# Patient Record
Sex: MALE | Race: WHITE | NOT HISPANIC OR LATINO | Employment: OTHER | URBAN - METROPOLITAN AREA
[De-identification: names, ages, dates, MRNs, and addresses within clinical notes are randomized per-mention and may not be internally consistent; named-entity substitution may affect disease eponyms.]

---

## 2017-02-17 ENCOUNTER — OFFICE VISIT (OUTPATIENT)
Dept: FAMILY MEDICINE | Facility: CLINIC | Age: 82
End: 2017-02-17
Payer: MEDICARE

## 2017-02-17 VITALS
OXYGEN SATURATION: 96 % | HEART RATE: 78 BPM | HEIGHT: 73 IN | WEIGHT: 208.56 LBS | SYSTOLIC BLOOD PRESSURE: 136 MMHG | DIASTOLIC BLOOD PRESSURE: 80 MMHG | TEMPERATURE: 98 F | BODY MASS INDEX: 27.64 KG/M2

## 2017-02-17 DIAGNOSIS — B34.9 VIRAL INFECTION: Primary | ICD-10-CM

## 2017-02-17 PROCEDURE — 96372 THER/PROPH/DIAG INJ SC/IM: CPT | Mod: S$GLB,,, | Performed by: NURSE PRACTITIONER

## 2017-02-17 PROCEDURE — 99213 OFFICE O/P EST LOW 20 MIN: CPT | Mod: 25,S$GLB,, | Performed by: NURSE PRACTITIONER

## 2017-02-17 RX ORDER — TRIAMCINOLONE ACETONIDE 40 MG/ML
80 INJECTION, SUSPENSION INTRA-ARTICULAR; INTRAMUSCULAR
Status: COMPLETED | OUTPATIENT
Start: 2017-02-17 | End: 2017-02-17

## 2017-02-17 RX ORDER — PANTOPRAZOLE SODIUM 40 MG/1
TABLET, DELAYED RELEASE ORAL
Refills: 12 | COMMUNITY
Start: 2017-01-25

## 2017-02-17 RX ORDER — METOPROLOL SUCCINATE 50 MG/1
50 TABLET, EXTENDED RELEASE ORAL DAILY
Refills: 1 | COMMUNITY
Start: 2017-01-16 | End: 2018-12-03

## 2017-02-17 RX ADMIN — TRIAMCINOLONE ACETONIDE 80 MG: 40 INJECTION, SUSPENSION INTRA-ARTICULAR; INTRAMUSCULAR at 10:02

## 2017-02-17 NOTE — PROGRESS NOTES
Subjective:       Patient ID: Karthikeyan Marshall is a 83 y.o. male.    Chief Complaint: Generalized Body Aches; Nasal Congestion; Sore Throat; and Cough    Sore Throat    This is a new problem. The current episode started in the past 7 days. Neither side of throat is experiencing more pain than the other. There has been no fever. The pain is at a severity of 2/10. The pain is mild. Associated symptoms include congestion, coughing, headaches and a hoarse voice. Pertinent negatives include no abdominal pain, diarrhea, drooling, ear discharge, ear pain, plugged ear sensation, neck pain, shortness of breath, stridor, swollen glands, trouble swallowing or vomiting. He has had no exposure to strep or mono. Treatments tried: zycam, cold ease. The treatment provided no relief.   Cough   This is a new problem. The current episode started in the past 7 days. The problem has been unchanged. The problem occurs every few minutes. The cough is productive of sputum. Associated symptoms include headaches, nasal congestion, postnasal drip and a sore throat. Pertinent negatives include no chest pain, chills, ear congestion, ear pain, fever, hemoptysis, myalgias, rash, rhinorrhea, shortness of breath, sweats, weight loss or wheezing. The symptoms are aggravated by lying down. Treatments tried: mucinex. There is no history of asthma, bronchiectasis, bronchitis, COPD, emphysema, environmental allergies or pneumonia.     Review of Systems   Constitutional: Negative for chills, diaphoresis, fatigue, fever and weight loss.   HENT: Positive for congestion, hoarse voice, postnasal drip and sore throat. Negative for drooling, ear discharge, ear pain, rhinorrhea and trouble swallowing.    Respiratory: Positive for cough. Negative for hemoptysis, chest tightness, shortness of breath, wheezing and stridor.    Cardiovascular: Negative for chest pain, palpitations and leg swelling.   Gastrointestinal: Negative for abdominal pain, diarrhea and vomiting.    Musculoskeletal: Negative for myalgias and neck pain.   Skin: Negative for rash.   Allergic/Immunologic: Negative for environmental allergies.   Neurological: Positive for headaches.       Objective:      Physical Exam   Constitutional: He is oriented to person, place, and time. He appears well-developed and well-nourished. No distress.   HENT:   Right Ear: Tympanic membrane and external ear normal.   Left Ear: Tympanic membrane and external ear normal.   Nose: No mucosal edema or rhinorrhea. Right sinus exhibits no maxillary sinus tenderness and no frontal sinus tenderness. Left sinus exhibits no maxillary sinus tenderness and no frontal sinus tenderness.   Mouth/Throat: No oropharyngeal exudate, posterior oropharyngeal edema or posterior oropharyngeal erythema.   Cardiovascular: Normal rate, regular rhythm and normal heart sounds.    Pulmonary/Chest: Effort normal and breath sounds normal.   Neurological: He is alert and oriented to person, place, and time.   Skin: Skin is warm and dry. He is not diaphoretic.   Psychiatric: He has a normal mood and affect. His behavior is normal. Judgment and thought content normal.   Vitals reviewed.      Assessment:       1. Viral infection        Plan:       Viral infection    Other orders  -     triamcinolone acetonide injection 80 mg; Inject 2 mLs (80 mg total) into the muscle one time.    continue mucinex for cough  Continue tylenol for body aches

## 2017-03-20 ENCOUNTER — OFFICE VISIT (OUTPATIENT)
Dept: FAMILY MEDICINE | Facility: CLINIC | Age: 82
End: 2017-03-20
Payer: MEDICARE

## 2017-03-20 VITALS
BODY MASS INDEX: 26.94 KG/M2 | WEIGHT: 203.25 LBS | TEMPERATURE: 98 F | SYSTOLIC BLOOD PRESSURE: 136 MMHG | HEART RATE: 97 BPM | OXYGEN SATURATION: 98 % | DIASTOLIC BLOOD PRESSURE: 70 MMHG | HEIGHT: 73 IN

## 2017-03-20 DIAGNOSIS — S41.111A SKIN TEAR OF UPPER ARM WITHOUT COMPLICATION, RIGHT, INITIAL ENCOUNTER: ICD-10-CM

## 2017-03-20 DIAGNOSIS — Z48.02 REMOVAL OF STAPLES: ICD-10-CM
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
## 2017-03-20 DIAGNOSIS — Z23 NEED FOR PNEUMOCOCCAL VACCINATION: ICD-10-CM

## 2017-03-20 DIAGNOSIS — R55 SYNCOPE, UNSPECIFIED SYNCOPE TYPE: Primary | ICD-10-CM

## 2017-03-20 PROCEDURE — G0009 ADMIN PNEUMOCOCCAL VACCINE: HCPCS | Mod: S$GLB,,, | Performed by: FAMILY MEDICINE

## 2017-03-20 PROCEDURE — 99213 OFFICE O/P EST LOW 20 MIN: CPT | Mod: S$GLB,,, | Performed by: FAMILY MEDICINE

## 2017-03-20 PROCEDURE — 90670 PCV13 VACCINE IM: CPT | Mod: S$GLB,,, | Performed by: FAMILY MEDICINE

## 2017-03-20 RX ORDER — ATORVASTATIN CALCIUM 10 MG/1
10 TABLET, FILM COATED ORAL DAILY
Refills: 1 | COMMUNITY
Start: 2017-03-08 | End: 2018-12-03

## 2017-03-20 NOTE — MR AVS SNAPSHOT
[facility]  [address] [place] [address]  [place] [address]  Phone: [phone]  Fax: [phone]                  Karthikeyan Marshall   3/20/2017 11:00 AM   Office Visit    Description:  Male : 1933   Provider:  Cy Blunt MD   Department:  [facility]           Reason for Visit     Follow-up           Diagnoses this Visit        Comments    Need for pneumococcal vaccination    -  Primary            To Do List           Goals (5 Years of Data)     None      PURCHASE these Medications (No prescription required)        Start End    wheelchair Milana 3/20/2017     Sig: For transportation    Class: OTC      [facility]     [facility] -  Assistance  Registered nurses in the [facility] provide clinical advisement, health education, appointment booking, and other advisory services.  Call for this free service at [phone].             Medications           Message regarding Medications     Verify the changes and/or additions to your medication regime listed below are the same as discussed with your clinician today.  If any of these changes or additions are incorrect, please notify your healthcare provider.        START taking these NEW medications        Refills    wheelchair Milana 0    Sig: For transportation    Class: OTC           Verify that the below list of medications is an accurate representation of the medications you are currently taking.  If none reported, the list may be blank. If incorrect, please contact your healthcare provider. Carry this list with you in case of emergency.           Current Medications     ascorbic acid (VITAMIN C) 500 MG tablet Take 500 mg by mouth once daily.    atorvastatin (LIPITOR) 10 MG tablet Take 10 mg by mouth once daily.    CALCIUM CARBONATE/VITAMIN D3 (VITAMIN D-3 ORAL) Take 5,000 Units by mouth once daily.     clopidogrel (PLAVIX) 75 mg tablet Take 75 mg by mouth once daily.    FOLIC  "ACID/MULTIVIT-MIN/LUTEIN (CENTRUM SILVER ORAL) Take by mouth once daily.     isosorbide mononitrate (IMDUR) 60 MG 24 hr tablet Take 60 mg by mouth once daily.    levetiracetam (KEPPRA) 750 MG Tab Take 750 mg by mouth 2 (two) times daily.    losartan (COZAAR) 100 MG tablet Take 100 mg by mouth once daily.    metoprolol succinate (TOPROL-XL) 50 MG 24 hr tablet Take 50 mg by mouth once daily.    pantoprazole (PROTONIX) 40 MG tablet TAKE 1 BY MOUTH ONCE A DAY    phenytoin (DILANTIN) 100 MG ER capsule 4 capsules and 5 capsules alternating days    TUDORZA PRESSAIR 400 mcg/actuation AePB Inhale 1 puff into the lungs 2 (two) times daily.    VENTOLIN HFA 90 mcg/actuation inhaler Inhale 1 puff into the lungs continuous prn.    wheelchair Milana For transportation           Clinical Reference Information           Your Vitals Were     BP Pulse Temp Height Weight SpO2    136/70 97 98.4 °F (36.9 °C) (Oral) 6' 0.5" (1.842 m) 92.2 kg (203 lb 4.2 oz) 98%    BMI                27.19 kg/m2          Blood Pressure          Most Recent Value    BP  136/70      Allergies as of 3/20/2017     Adhesive    Bactrim [Sulfamethoxazole-trimethoprim]      Immunizations Administered on Date of Encounter - 3/20/2017     Name Date Dose VIS Date Route    Pneumococcal Conjugate - 13 Valent  Incomplete 0.5 mL 11/5/2015 Intramuscular      Orders Placed During Today's Visit      Normal Orders This Visit    Pneumococcal Conjugate Vaccine (13 Valent) (IM)       Language Assistance Services     ATTENTION: Language assistance services are available, free of charge. Please call [phone].      ATENCIÓN: Si varshala margoth, tiene a bhatia disposición servicios gratuitos de asistencia lingüística. Llame al [phone].     PAL Ý: N?u b?n nói Ti?ng Vi?t, có các d?ch v? h? tr? ngôn ng? mi?n phí dành cho b?n. G?i s? [phone].         [facility] complies with applicable Federal civil rights laws and does not discriminate on the basis of race, " color, national origin, age, disability, or sex.

## 2017-03-26 NOTE — PROGRESS NOTES
Patient ID: Karthikeyan Marshall is a 83 y.o. male.    Chief Complaint: Follow-up (E/R follow-up, post fall, staple removal)    HPI      Karthikeyan Marshall is a 83 y.o. male here following up on admit for syncope.  Pt worked up extensivly and no cause other than suspected orthostatic hypotension found.  He had anther fall will trying to put on pants incorrectly and sustained a rt arm skin tear.   Pt with staples in scalp here for removal of them              Review of Symptoms    Constitutional  Neg activity change, No chills /fever   Resp  Neg hemoptysis, stridor, choking  CVS  Neg chest pain, palpitations    Physical Exam    Constitutional:  Oriented to person, place, and time.appears well-developed and well-nourished.  No distress.      HENT  Head: Normocephalic and atraumatic  Right Ear: External ear normal.   Left Ear: External ear normal.   Nose: External nose normal.   Mouth:  Moist mucus membranes.    Eyes:  Conjunctivae are normal. Right eye exhibits no discharge.  Left eye exhibits no discharge. No scleral icterus.  No periorbital edema    Cardiovascular:  Regular rate, Regular rhythm     No extrasystole  Normal S1-S2      Pulmonary/Chest:   Normal effort and breath sounds.  No wheezing, rhonchi or rales.    Musculoskeletal:  No edema. No obvious deformity No waisting       Neurological:  Alert and oriented to person, place, and time.   Coordination normal.     Skin:   Skin is warm and dry.  No diaphoresis.   No rash noted.   Scalp removed 3 staples without problem  Rt arm with skin tear   dresssed    Psychiatric: Normal mood and affect. Behavior is normal.  Judgment and thought content normal.       Assessment / Plan:      ICD-10-CM ICD-9-CM   1. Syncope, unspecified syncope type R55 780.2   2. Fall, sequela W19.XXXS 909.4     E929.3   3. Removal of staples Z48.02 V58.32   4. Need for pneumococcal vaccination Z23 V03.82   5. Skin tear of upper arm without complication, right, initial encounter S41.111A 880.03      Syncope, unspecified syncope type    Fall, sequela    Removal of staples    Need for pneumococcal vaccination  -     Pneumococcal Conjugate Vaccine (13 Valent) (IM)    Skin tear of upper arm without complication, right, initial encounter    Other orders  -     wheelchair Milana; For transportation; Refill: 0

## 2017-03-30 ENCOUNTER — PATIENT MESSAGE (OUTPATIENT)
Dept: FAMILY MEDICINE | Facility: CLINIC | Age: 82
End: 2017-03-30

## 2017-04-27 ENCOUNTER — OFFICE VISIT (OUTPATIENT)
Dept: FAMILY MEDICINE | Facility: CLINIC | Age: 82
End: 2017-04-27
Payer: MEDICARE

## 2017-04-27 VITALS
HEART RATE: 87 BPM | SYSTOLIC BLOOD PRESSURE: 136 MMHG | HEIGHT: 73 IN | WEIGHT: 202.38 LBS | OXYGEN SATURATION: 98 % | TEMPERATURE: 98 F | DIASTOLIC BLOOD PRESSURE: 78 MMHG | BODY MASS INDEX: 26.82 KG/M2

## 2017-04-27 DIAGNOSIS — R26.9 GAIT DISTURBANCE: ICD-10-CM

## 2017-04-27 DIAGNOSIS — S50.812S: ICD-10-CM

## 2017-04-27 DIAGNOSIS — M25.812 SHOULDER IMPINGEMENT, LEFT: Primary | ICD-10-CM

## 2017-04-27 PROCEDURE — 99213 OFFICE O/P EST LOW 20 MIN: CPT | Mod: S$GLB,,, | Performed by: FAMILY MEDICINE

## 2017-04-27 RX ORDER — MINOCYCLINE HYDROCHLORIDE 100 MG/1
100 CAPSULE ORAL EVERY 12 HOURS
Refills: 0 | COMMUNITY
Start: 2017-04-17 | End: 2018-12-03

## 2017-04-29 NOTE — PROGRESS NOTES
Patient ID: Karthikeyan Marshall is a 83 y.o. male.    Chief Complaint: Follow-up (hospital f/u)    HPI       Karthikeyan Marshall is a 83 y.o. male here following up on fall with recent's emergency room at [facility].  He has a few skin tears on the ulnar side of left forearm.  He has had several falls in the last few weeks.  No complaints of EGD complaints of dizziness LOC-no chest pain palpitations shortness of breath.  Appear to be gait related.    Complains of left shoulder pain occasionally and decreased range of motion with inability to abduct fully.        Review of Symptoms    Constitutional  multiple falls , No chills fever   Resp  Neg hemoptysis, stridor, choking  CVS  Neg chest pain, palpitations    Physical Exam    Constitutional:   Oriented to person, place, and time.appears well-developed and well-nourished.   No distress.     HENT  Head: Normocephalic and atraumatic  Right Ear: External ear normal.   Left Ear: External ear normal.   Nose: External nose normal.   Mouth: Moist mucous membranes    Eyes:   Conjunctivae are normal. Right eye exhibits no discharge. Left eye exhibits no discharge. No scleral icterus. No periorbital edema    Musculoskeletal:  No edema. No obvious deformity No waisting  Left shoulder with some crepitus abduction only up to 60°     slight decrease in muscle strength watching in standard supine position   Uses walker to ambulate.        Neurological:  Alert and oriented to person, place, and time. Coordination normal.     Skin:   Skin is warm and dry.  No diaphoresis.   No rash noted.   Forearm with 2 areas  abrasion-proximal one approximately 3 cm x 5 cm distal wound approximately 3 cm representative  Great granulation tissue below  Psychiatric: Normal mood and affect. Behavior is normal. Judgment and thought content normal.       Assessment / Plan:      ICD-10-CM ICD-9-CM   1. Shoulder impingement, left M75.42 726.2   2. Gait disturbance R26.9 781.2   3. Forearm abrasion, left, sequela  S50.812S 906.2     Shoulder impingement, left  -     Ambulatory Referral to Physical/Occupational Therapy    Gait disturbance  -     Ambulatory Referral to Physical/Occupational Therapy    Forearm abrasion, left, sequela      Abrasions left forearm-continue to address daily

## 2017-05-05 ENCOUNTER — PATIENT MESSAGE (OUTPATIENT)
Dept: FAMILY MEDICINE | Facility: CLINIC | Age: 82
End: 2017-05-05

## 2017-05-09 ENCOUNTER — PATIENT MESSAGE (OUTPATIENT)
Dept: FAMILY MEDICINE | Facility: CLINIC | Age: 82
End: 2017-05-09

## 2017-05-09 DIAGNOSIS — R26.9 GAIT DISTURBANCE: ICD-10-CM

## 2017-05-09 DIAGNOSIS — M25.812 SHOULDER IMPINGEMENT, LEFT: Primary | ICD-10-CM

## 2017-05-19 ENCOUNTER — TELEPHONE (OUTPATIENT)
Dept: FAMILY MEDICINE | Facility: CLINIC | Age: 82
End: 2017-05-19

## 2017-05-23 ENCOUNTER — TELEPHONE (OUTPATIENT)
Dept: FAMILY MEDICINE | Facility: CLINIC | Age: 82
End: 2017-05-23

## 2017-05-23 DIAGNOSIS — M25.812 SHOULDER IMPINGEMENT, LEFT: Primary | ICD-10-CM

## 2017-05-23 NOTE — TELEPHONE ENCOUNTER
----- Message from Ilda Alcala sent at 5/23/2017 11:47 AM CDT -----  Contact: Josie pt [phone]  Patient originally requested physical therapy with [facility] but there elevators are broken and wont be fixed until the beginning of June. Wife requesting a new referral for  [facility].

## 2017-10-20 ENCOUNTER — TELEPHONE (OUTPATIENT)
Dept: FAMILY MEDICINE | Facility: CLINIC | Age: 82
End: 2017-10-20

## 2017-10-24 ENCOUNTER — PATIENT MESSAGE (OUTPATIENT)
Dept: FAMILY MEDICINE | Facility: CLINIC | Age: 82
End: 2017-10-24

## 2017-10-24 DIAGNOSIS — R26.9 GAIT DISTURBANCE: ICD-10-CM

## 2017-10-24 DIAGNOSIS — G62.9 PERIPHERAL POLYNEUROPATHY: Primary | ICD-10-CM
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
## 2017-11-09 ENCOUNTER — TELEPHONE (OUTPATIENT)
Dept: FAMILY MEDICINE | Facility: CLINIC | Age: 82
End: 2017-11-09

## 2017-11-09 DIAGNOSIS — R26.9 GAIT DISTURBANCE: ICD-10-CM

## 2017-11-09 DIAGNOSIS — M25.812 SHOULDER IMPINGEMENT, LEFT: ICD-10-CM

## 2017-11-09 DIAGNOSIS — J43.9 PULMONARY EMPHYSEMA, UNSPECIFIED EMPHYSEMA TYPE: ICD-10-CM

## 2017-11-09 DIAGNOSIS — E11.40 TYPE 2 DIABETES MELLITUS WITH DIABETIC NEUROPATHY, WITHOUT LONG-TERM CURRENT USE OF INSULIN: ICD-10-CM

## 2017-11-09 DIAGNOSIS — K21.9 GASTROESOPHAGEAL REFLUX DISEASE WITHOUT ESOPHAGITIS: ICD-10-CM

## 2017-11-09 DIAGNOSIS — G62.9 PERIPHERAL POLYNEUROPATHY: Primary | ICD-10-CM

## 2017-11-09 NOTE — TELEPHONE ENCOUNTER
Please order labs to be done at [facility]   He has an appt. Here scheduled for nov 20

## 2017-11-14 LAB
ALBUMIN SERPL-MCNC: 4.7 G/DL (ref 3.6–5.1)
ALBUMIN/GLOB SERPL: 2.1 (CALC) (ref 1–2.5)
ALP SERPL-CCNC: 121 U/L (ref 40–115)
ALT SERPL-CCNC: 16 U/L (ref 9–46)
AST SERPL-CCNC: 28 U/L (ref 10–35)
BILIRUB SERPL-MCNC: 0.7 MG/DL (ref 0.2–1.2)
BUN SERPL-MCNC: 22 MG/DL (ref 7–25)
BUN/CREAT SERPL: ABNORMAL (CALC) (ref 6–22)
CALCIUM SERPL-MCNC: 9.8 MG/DL (ref 8.6–10.3)
CHLORIDE SERPL-SCNC: 102 MMOL/L (ref 98–110)
CO2 SERPL-SCNC: 28 MMOL/L (ref 20–31)
CREAT SERPL-MCNC: 1.06 MG/DL (ref 0.7–1.11)
GFR SERPL CREATININE-BSD FRML MDRD: 64 ML/MIN/1.73M2
GLOBULIN SER CALC-MCNC: 2.2 G/DL (CALC) (ref 1.9–3.7)
GLUCOSE SERPL-MCNC: 120 MG/DL (ref 65–99)
POTASSIUM SERPL-SCNC: 5.3 MMOL/L (ref 3.5–5.3)
PROT SERPL-MCNC: 6.9 G/DL (ref 6.1–8.1)
SODIUM SERPL-SCNC: 139 MMOL/L (ref 135–146)

## 2017-11-16 ENCOUNTER — TELEPHONE (OUTPATIENT)
Dept: FAMILY MEDICINE | Facility: CLINIC | Age: 82
End: 2017-11-16

## 2017-11-16 NOTE — TELEPHONE ENCOUNTER
----- Message from Cy Blunt MD sent at 11/14/2017 11:52 PM CST -----  Lab Results are good  Some mild abnormalities but no need to change current treatment plan  I notified the pt wife

## 2017-11-17 ENCOUNTER — TELEPHONE (OUTPATIENT)
Dept: FAMILY MEDICINE | Facility: CLINIC | Age: 82
End: 2017-11-17

## 2017-11-17 DIAGNOSIS — E11.9 TYPE 2 DIABETES MELLITUS WITHOUT COMPLICATION: ICD-10-CM

## 2017-11-17 NOTE — TELEPHONE ENCOUNTER
----- Message from Skye Larson sent at 11/17/2017  1:09 PM CST -----  Wife calling for orders to go to [facility] to have A1C & cholesterol drawn.     ** orders already placed.   Change orders and notify pt

## 2018-02-14 ENCOUNTER — PATIENT MESSAGE (OUTPATIENT)
Dept: FAMILY MEDICINE | Facility: CLINIC | Age: 83
End: 2018-02-14

## 2018-05-16 ENCOUNTER — PATIENT MESSAGE (OUTPATIENT)
Dept: ADMINISTRATIVE | Facility: HOSPITAL | Age: 83
End: 2018-05-16

## 2018-11-11 ENCOUNTER — PATIENT MESSAGE (OUTPATIENT)
Dept: FAMILY MEDICINE | Facility: CLINIC | Age: 83
End: 2018-11-11

## 2018-12-03 ENCOUNTER — PES CALL (OUTPATIENT)
Dept: ADMINISTRATIVE | Facility: CLINIC | Age: 83
End: 2018-12-03

## 2018-12-03 ENCOUNTER — OFFICE VISIT (OUTPATIENT)
Dept: FAMILY MEDICINE | Facility: CLINIC | Age: 83
End: 2018-12-03
Payer: MEDICARE

## 2018-12-03 VITALS
HEIGHT: 73 IN | BODY MASS INDEX: 24.37 KG/M2 | WEIGHT: 183.88 LBS | HEART RATE: 92 BPM | OXYGEN SATURATION: 97 % | TEMPERATURE: 98 F | SYSTOLIC BLOOD PRESSURE: 136 MMHG | DIASTOLIC BLOOD PRESSURE: 76 MMHG
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
## 2018-12-03 DIAGNOSIS — E11.40 TYPE 2 DIABETES MELLITUS WITH DIABETIC NEUROPATHY, WITHOUT LONG-TERM CURRENT USE OF INSULIN: ICD-10-CM

## 2018-12-03 DIAGNOSIS — R26.9 GAIT DISTURBANCE: ICD-10-CM

## 2018-12-03 DIAGNOSIS — Z00.00 ROUTINE HEALTH MAINTENANCE: Primary | ICD-10-CM

## 2018-12-03 DIAGNOSIS — G62.9 PERIPHERAL POLYNEUROPATHY: ICD-10-CM

## 2018-12-03 PROCEDURE — 90662 IIV NO PRSV INCREASED AG IM: CPT | Mod: S$GLB,,, | Performed by: FAMILY MEDICINE

## 2018-12-03 PROCEDURE — G0008 ADMIN INFLUENZA VIRUS VAC: HCPCS | Mod: S$GLB,,, | Performed by: FAMILY MEDICINE

## 2018-12-03 PROCEDURE — 99397 PER PM REEVAL EST PAT 65+ YR: CPT | Mod: 25,S$GLB,, | Performed by: FAMILY MEDICINE

## 2018-12-03 PROCEDURE — 3075F SYST BP GE 130 - 139MM HG: CPT | Mod: S$GLB,,, | Performed by: FAMILY MEDICINE

## 2018-12-03 PROCEDURE — 3078F DIAST BP <80 MM HG: CPT | Mod: S$GLB,,, | Performed by: FAMILY MEDICINE

## 2018-12-03 RX ORDER — MEMANTINE HYDROCHLORIDE 5 MG/1
1 TABLET ORAL DAILY
COMMUNITY
Start: 2018-11-11

## 2018-12-03 RX ORDER — IPRATROPIUM BROMIDE AND ALBUTEROL SULFATE 2.5; .5 MG/3ML; MG/3ML
SOLUTION RESPIRATORY (INHALATION)
Refills: 11 | COMMUNITY
Start: 2018-09-12

## 2018-12-03 RX ORDER — LANOLIN ALCOHOL/MO/W.PET/CERES
1 CREAM (GRAM) TOPICAL DAILY
COMMUNITY
Start: 2018-10-08 | End: 2020-02-28

## 2018-12-03 RX ORDER — INSULIN PUMP SYRINGE, 3 ML
EACH MISCELLANEOUS
Qty: 1 EACH | Refills: 0 | Status: SHIPPED | OUTPATIENT
Start: 2018-12-03

## 2018-12-03 NOTE — PROGRESS NOTES
Patient ID: Karthikeyan Marshall is a 85 y.o. male.    Chief Complaint: Annual Exam    HPI      Karthikeyan Marshall is a 85 y.o. male here for annual examination.  Overall doing well recently.  Continues to suffer with fatigue. Many medicines discontinued by cardiologist because of fatigue.  Patient with history of TIA, hypertension, emphysema, reflux, hyperlipidemia, seizures, peripheral neuropathy and diabetes.  Stable at this time.  Reviewed current medications.      Review of Symptoms    Constitutional  Neg activity change, No chills /fever   Resp  Neg hemoptysis, stridor, choking  CVS  Neg chest pain, palpitations   other review of systems no new problems      Physical Exam    Constitutional:  Oriented to person, place, and time.appears   Walks with walker   No distress.      HENT  Head: Normocephalic and atraumatic  Right Ear: External ear normal.   Left Ear: External ear normal.   Nose: External nose normal.   Mouth:  Moist mucus membranes.    Eyes:  Conjunctivae are normal. Right eye exhibits no discharge.  Left eye exhibits no discharge. No scleral icterus.  No periorbital edema    Cardiovascular:  Regular rate and rhythm with normal S1 and S2     Pulmonary/Chest:   Clear to auscultation bilaterally without wheezes, rhonchi or rales      Musculoskeletal:  No edema. No obvious deformity No wasting       Neurological:  Alert and oriented to person, place, and time.   Coordination normal.     Skin:   Skin is warm and dry.  No diaphoresis.   Areas of bruise and trauma No rash noted.     Psychiatric: Normal mood and affect. Behavior is normal.  Judgment and thought content normal.     Complete Blood Count  Lab Results   Component Value Date    RBC 4.75 05/06/2016    HGB 14.9 05/06/2016    HCT 44.0 05/06/2016    MCV 93 05/06/2016    MCH 31.4 (H) 05/06/2016    MCHC 33.9 05/06/2016    RDW 13.3 05/06/2016     05/06/2016    MPV 9.5 05/06/2016    GRAN 4.9 05/06/2016    GRAN 56.1 05/06/2016    LYMPH 2.6 05/06/2016    LYMPH  29.9 05/06/2016    MONO 0.9 05/06/2016    MONO 10.6 05/06/2016    EOS 0.2 05/06/2016    BASO 0.06 05/06/2016    EOSINOPHIL 2.5 05/06/2016    BASOPHIL 0.7 05/06/2016    DIFFMETHOD Automated 05/06/2016       Comprehensive Metabolic Panel  No results found for: GLU, BUN, CREATININE, NA, K, CL, PROT, ALBUMIN, BILITOT, AST, ALKPHOS, CO2, ALT, ANIONGAP, EGFRNONAA, ESTGFRAFRICA    TSH  No results found for: TSH    Assessment / Plan:      ICD-10-CM ICD-9-CM   1. Routine health maintenance Z00.00 V70.0   2. Peripheral polyneuropathy G62.9 356.9   3. Fall, sequela W19.XXXS 909.4     E929.3   4. Gait disturbance R26.9 781.2   5. Type 2 diabetes mellitus with diabetic neuropathy, without long-term current use of insulin E11.40 250.60     357.2     Routine health maintenance  -     Influenza - High Dose (65+) (PF) (IM)    Peripheral polyneuropathy    Fall, sequela    Gait disturbance    Type 2 diabetes mellitus with diabetic neuropathy, without long-term current use of insulin  -     Hemoglobin A1c; Future    Other orders  -     blood-glucose meter kit; Dispense meter  brand covered by insurance  Dispense: 1 each; Refill: 0  -     diabetic supplies, miscellan. Misc; Lancets for 1-2 times a day testing    dispense brand covered by insurance t  Dispense: 60 each; Refill: 3  -     blood sugar diagnostic (BLOOD GLUCOSE TEST) Strp; Strips for one to 2 times a day testing   dispense brand covered by insurance and to match meter brand  Dispense: 60 each; Refill: 3     Screening items-flu shot given today-hemoglobin Ha1c ordered other lab work ordered by physicians Jeremy Sae

## 2018-12-05 LAB — HBA1C MFR BLD: 5.5 % OF TOTAL HGB

## 2018-12-06 ENCOUNTER — TELEPHONE (OUTPATIENT)
Dept: FAMILY MEDICINE | Facility: CLINIC | Age: 83
End: 2018-12-06

## 2018-12-26 ENCOUNTER — TELEPHONE (OUTPATIENT)
Dept: FAMILY MEDICINE | Facility: CLINIC | Age: 83
End: 2018-12-26

## 2018-12-26 NOTE — TELEPHONE ENCOUNTER
Patient scheduled     ----- Message from Shelia Irwin sent at 12/26/2018  3:02 PM CST -----  Contact: [phone]/wife Josie  Patient's wife is requesting to speak with you concerning an injury her  received after a fall. Please advise.

## 2018-12-27 ENCOUNTER — OFFICE VISIT (OUTPATIENT)
Dept: FAMILY MEDICINE | Facility: CLINIC | Age: 83
End: 2018-12-27
Payer: MEDICARE

## 2018-12-27 VITALS
TEMPERATURE: 98 F | HEART RATE: 86 BPM | OXYGEN SATURATION: 99 % | WEIGHT: 191.38 LBS | SYSTOLIC BLOOD PRESSURE: 140 MMHG | HEIGHT: 73 IN | DIASTOLIC BLOOD PRESSURE: 70 MMHG | BODY MASS INDEX: 25.36 KG/M2

## 2018-12-27 DIAGNOSIS — L03.90 CELLULITIS, UNSPECIFIED CELLULITIS SITE: ICD-10-CM

## 2018-12-27 DIAGNOSIS — S41.112A SKIN TEAR OF LEFT UPPER ARM WITHOUT COMPLICATION, INITIAL ENCOUNTER: Primary | ICD-10-CM

## 2018-12-27 PROCEDURE — 1101F PT FALLS ASSESS-DOCD LE1/YR: CPT | Mod: S$GLB,,, | Performed by: FAMILY MEDICINE

## 2018-12-27 PROCEDURE — 99213 OFFICE O/P EST LOW 20 MIN: CPT | Mod: S$GLB,,, | Performed by: FAMILY MEDICINE

## 2018-12-27 PROCEDURE — 3077F SYST BP >= 140 MM HG: CPT | Mod: S$GLB,,, | Performed by: FAMILY MEDICINE

## 2018-12-27 PROCEDURE — 3078F DIAST BP <80 MM HG: CPT | Mod: S$GLB,,, | Performed by: FAMILY MEDICINE

## 2018-12-27 RX ORDER — SILVER SULFADIAZINE 10 G/1000G
CREAM TOPICAL DAILY
Qty: 50 G | Refills: 1 | Status: SHIPPED | OUTPATIENT
Start: 2018-12-27 | End: 2020-02-28

## 2018-12-27 RX ORDER — CEPHALEXIN 500 MG/1
500 CAPSULE ORAL EVERY 8 HOURS
Qty: 21 CAPSULE | Refills: 0 | Status: SHIPPED | OUTPATIENT
Start: 2018-12-27 | End: 2020-02-28

## 2018-12-27 NOTE — PROGRESS NOTES
Subjective:       Patient ID: Karthikeyan Marshall is a 85 y.o. male.    Chief Complaint:   Chief Complaint   Patient presents with    Fall     12/12/18    Abrasion     rt arm        Rash   This is a new problem. The current episode started in the past 7 days. The problem has been gradually improving since onset. Location: Right upper inner arm skin tear.  The rash is characterized by redness and pain (skin tear is healing well, but increasing redness in surrounding tissue). Pertinent negatives include no congestion, cough, diarrhea, eye pain, fatigue, fever, rhinorrhea, shortness of breath or vomiting. Past treatments include antibiotic cream. The treatment provided moderate relief.     Review of Systems   Constitutional: Negative for activity change, appetite change, chills, fatigue and fever.   HENT: Negative for congestion, ear discharge, hearing loss, mouth sores, rhinorrhea, sinus pain and trouble swallowing.    Eyes: Negative for photophobia, pain, discharge and visual disturbance.   Respiratory: Negative for cough, chest tightness, shortness of breath and wheezing.    Cardiovascular: Negative for chest pain, palpitations and leg swelling.   Gastrointestinal: Negative for abdominal distention, abdominal pain, blood in stool, constipation, diarrhea, nausea and vomiting.   Genitourinary: Negative for difficulty urinating, dysuria and flank pain.   Musculoskeletal: Negative for arthralgias, back pain, gait problem, joint swelling and myalgias.   Skin: Positive for wound. Negative for color change and rash.   Neurological: Negative for dizziness, tremors, speech difficulty, light-headedness, numbness and headaches.   Psychiatric/Behavioral: Negative for behavioral problems, confusion, hallucinations, sleep disturbance and suicidal ideas.       Past Medical History:   Diagnosis Date    Cancer     Emphysema lung     GERD (gastroesophageal reflux disease)     Hyperlipidemia     Hypertension     Seizures     Type II  "or unspecified type diabetes mellitus without mention of complication, not stated as uncontrolled      Social History     Socioeconomic History    Marital status:      Spouse name: Not on file    Number of children: Not on file    Years of education: Not on file    Highest education level: Not on file   Social Needs    Financial resource strain: Not on file    Food insecurity - worry: Not on file    Food insecurity - inability: Not on file    Transportation needs - medical: Not on file    Transportation needs - non-medical: Not on file   Occupational History    Not on file   Tobacco Use    Smoking status: Former Smoker   Substance and Sexual Activity    Alcohol use: Yes    Drug use: No    Sexual activity: Not on file   Other Topics Concern    Not on file   Social History Narrative    Not on file       Objective:     BP (!) 140/70 (BP Location: Left arm, Patient Position: Sitting)   Pulse 86   Temp 98.2 °F (36.8 °C) (Oral)   Ht 6' 0.5" (1.842 m)   Wt 86.8 kg (191 lb 5.8 oz)   SpO2 99%   BMI 25.60 kg/m²     Physical Exam   Constitutional: He appears well-developed.   HENT:   Head: Normocephalic.   Eyes: Conjunctivae are normal.   Neck: Normal range of motion. Neck supple.   Cardiovascular: Normal rate and regular rhythm.   Pulmonary/Chest: Effort normal and breath sounds normal.   Neurological: He is alert.   Skin: Skin is warm.        10cm x 7cm skin tear.  Base is helaing well.  Redness is spreading arout=nd the medial elbow.  Induration and mild swelling also present.    Psychiatric: He has a normal mood and affect.   Nursing note and vitals reviewed.      Assessment:       Skin tear of left upper arm without complication, initial encounter    Cellulitis, unspecified cellulitis site    Other orders  -     cephALEXin (KEFLEX) 500 MG capsule; Take 1 capsule (500 mg total) by mouth every 8 (eight) hours.  Dispense: 21 capsule; Refill: 0  -     silver sulfADIAZINE 1% (SILVADENE) 1 % cream; " Apply topically once daily.  Dispense: 50 g; Refill: 1

## 2019-02-28 ENCOUNTER — PATIENT MESSAGE (OUTPATIENT)
Dept: FAMILY MEDICINE | Facility: CLINIC | Age: 84
End: 2019-02-28

## 2019-07-05 ENCOUNTER — TELEPHONE (OUTPATIENT)
Dept: FAMILY MEDICINE | Facility: CLINIC | Age: 84
End: 2019-07-05

## 2019-07-05 NOTE — TELEPHONE ENCOUNTER
[facility] will be faxing orders for diabetic shoes and custom insert today.      ----- Message from Bren Combs sent at 7/5/2019 12:02 PM CDT -----  Contact: [phone] Josie Alicia is requesting to speak with Lizzette re: diabetic shoes. Please advise

## 2019-07-08 ENCOUNTER — PATIENT MESSAGE (OUTPATIENT)
Dept: FAMILY MEDICINE | Facility: CLINIC | Age: 84
End: 2019-07-08

## 2019-07-08 NOTE — TELEPHONE ENCOUNTER
"Recent from [facility] did not take rx bc from "podiatrist   Dr. HAWKINS    I sent the form back saying that he is endocrinologist-so hopefully they get it through his previous order  "

## 2019-07-08 NOTE — TELEPHONE ENCOUNTER
Shelia TRISTAN [facility] Staff   Caller: wife Josie [phone] (Today,  1:11 PM)             Patient's spouse calling in regarding an order for diabetic shoes. Please call and advise.

## 2019-07-08 NOTE — TELEPHONE ENCOUNTER
I spoke with Lizzette at [facility] - she will refax the form to me for dr martinez to complete - she did not receive the form faxed on Friday to her  I dont see it scanned in media

## 2019-07-09 ENCOUNTER — TELEPHONE (OUTPATIENT)
Dept: FAMILY MEDICINE | Facility: CLINIC | Age: 84
End: 2019-07-09

## 2019-07-09 NOTE — TELEPHONE ENCOUNTER
----- Message from Micaela Varghese sent at 7/9/2019  9:36 AM CDT -----  Contact: Lizzette @ [facility] [phone] ext [phone]  Lizzette is requesting office notes for patients diabetic shoes. Please call.

## 2019-07-27 ENCOUNTER — OFFICE VISIT (OUTPATIENT)
Dept: FAMILY MEDICINE | Facility: CLINIC | Age: 84
End: 2019-07-27
Payer: MEDICARE

## 2019-07-27 VITALS
BODY MASS INDEX: 25.79 KG/M2 | OXYGEN SATURATION: 97 % | HEART RATE: 95 BPM | HEIGHT: 73 IN | WEIGHT: 194.56 LBS | DIASTOLIC BLOOD PRESSURE: 66 MMHG | TEMPERATURE: 98 F | SYSTOLIC BLOOD PRESSURE: 136 MMHG

## 2019-07-27 DIAGNOSIS — R31.0 GROSS HEMATURIA: Primary | ICD-10-CM

## 2019-07-27 DIAGNOSIS — K59.00 CONSTIPATION, UNSPECIFIED CONSTIPATION TYPE: ICD-10-CM

## 2019-07-27 PROCEDURE — 99214 PR OFFICE/OUTPT VISIT, EST, LEVL IV, 30-39 MIN: ICD-10-PCS | Mod: S$GLB,,, | Performed by: FAMILY MEDICINE

## 2019-07-27 PROCEDURE — 1101F PT FALLS ASSESS-DOCD LE1/YR: CPT | Mod: S$GLB,,, | Performed by: FAMILY MEDICINE

## 2019-07-27 PROCEDURE — 3075F PR MOST RECENT SYSTOLIC BLOOD PRESS GE 130-139MM HG: ICD-10-PCS | Mod: S$GLB,,, | Performed by: FAMILY MEDICINE

## 2019-07-27 PROCEDURE — 3075F SYST BP GE 130 - 139MM HG: CPT | Mod: S$GLB,,, | Performed by: FAMILY MEDICINE

## 2019-07-27 PROCEDURE — 99214 OFFICE O/P EST MOD 30 MIN: CPT | Mod: S$GLB,,, | Performed by: FAMILY MEDICINE

## 2019-07-27 PROCEDURE — 1101F PR PT FALLS ASSESS DOC 0-1 FALLS W/OUT INJ PAST YR: ICD-10-PCS | Mod: S$GLB,,, | Performed by: FAMILY MEDICINE

## 2019-07-27 PROCEDURE — 3078F DIAST BP <80 MM HG: CPT | Mod: S$GLB,,, | Performed by: FAMILY MEDICINE

## 2019-07-27 PROCEDURE — 3078F PR MOST RECENT DIASTOLIC BLOOD PRESSURE < 80 MM HG: ICD-10-PCS | Mod: S$GLB,,, | Performed by: FAMILY MEDICINE

## 2019-07-27 RX ORDER — CLOPIDOGREL BISULFATE 75 MG/1
TABLET ORAL
COMMUNITY
Start: 2019-05-26

## 2019-07-27 RX ORDER — ATORVASTATIN CALCIUM 20 MG/1
TABLET, FILM COATED ORAL
COMMUNITY
Start: 2019-05-26

## 2019-07-27 NOTE — PROGRESS NOTES
Chief Complaint  Chief Complaint   Patient presents with    Hospital Follow Up     Blood in Urine; constipation        HPI  Karthikeyan Marshall is a 85 y.o. male with multiple medical diagnoses as listed in the medical history and problem list that presents for Er f/u.  He was seen at [facility] ER for abdominal pain about 1 week ago.  He was impacted.  The impaction was removed and he is now on miralax daily.  He reports that he is now having a BM daily.  His stools are soft.      Yesterday he noted bright red blood in his urine.  It occurred twice.  No dysuria frequency, hesitancy or abdominal pain.   Unable to give a urine sample today.     PAST MEDICAL HISTORY:  Past Medical History:   Diagnosis Date    Cancer     Emphysema lung     GERD (gastroesophageal reflux disease)     Hyperlipidemia     Hypertension     Seizures     Type II or unspecified type diabetes mellitus without mention of complication, not stated as uncontrolled        PAST SURGICAL HISTORY:  Past Surgical History:   Procedure Laterality Date    APPENDECTOMY      BASAL CELL CARCINOMA EXCISION Left 02/01/2017    nose    CARDIAC CATHETERIZATION      COLONOSCOPY      2010    CORONARY ARTERY BYPASS GRAFT      HERNIA REPAIR      LUNG CANCER SURGERY      PROSTATE SURGERY      TOE AMPUTATION Left     TONSILLECTOMY         SOCIAL HISTORY:  Social History     Socioeconomic History    Marital status:      Spouse name: Not on file    Number of children: Not on file    Years of education: Not on file    Highest education level: Not on file   Occupational History    Not on file   Social Needs    Financial resource strain: Not on file    Food insecurity:     Worry: Not on file     Inability: Not on file    Transportation needs:     Medical: Not on file     Non-medical: Not on file   Tobacco Use    Smoking status: Former Smoker    Smokeless tobacco: Never Used   Substance and Sexual Activity    Alcohol use: Yes    Drug use: No     Sexual activity: Not on file   Lifestyle    Physical activity:     Days per week: Not on file     Minutes per session: Not on file    Stress: Not on file   Relationships    Social connections:     Talks on phone: Not on file     Gets together: Not on file     Attends Christian service: Not on file     Active member of club or organization: Not on file     Attends meetings of clubs or organizations: Not on file     Relationship status: Not on file   Other Topics Concern    Not on file   Social History Narrative    Not on file       FAMILY HISTORY:  Family History   Problem Relation Age of Onset    Cancer Mother     Hypertension Father     Heart disease Father        ALLERGIES AND MEDICATIONS: updated and reviewed.  Review of patient's allergies indicates:   Allergen Reactions    Adhesive     Adhesive tape-silicones      Other reaction(s): rash with most tapes. ok if use paer tape    Bactrim [sulfamethoxazole-trimethoprim]      Current Outpatient Medications   Medication Sig Dispense Refill    albuterol-ipratropium (DUO-NEB) 2.5 mg-0.5 mg/3 mL nebulizer solution NEBULIZE 1 VIAL 3 TIMES DAILY  11    ascorbic acid (VITAMIN C) 500 MG tablet Take 500 mg by mouth once daily.      atorvastatin (LIPITOR) 20 MG tablet       blood sugar diagnostic (BLOOD GLUCOSE TEST) Strp Strips for one to 2 times a day testing   dispense brand covered by insurance and to match meter brand 180 each 3    blood-glucose meter kit Dispense meter  brand covered by insurance 1 each 0    CALCIUM CARBONATE/VITAMIN D3 (VITAMIN D-3 ORAL) Take 5,000 Units by mouth once daily.       cephALEXin (KEFLEX) 500 MG capsule Take 1 capsule (500 mg total) by mouth every 8 (eight) hours. 21 capsule 0    clopidogrel (PLAVIX) 75 mg tablet       cyanocobalamin (VITAMIN B-12) 1000 MCG tablet Take 1 tablet by mouth once daily.      diabetic supplies, [employer]. Misc Lancets for 1-2 times a day testing    dispense brand covered by insurance t  "180 each 3    FOLIC ACID/MULTIVIT-MIN/LUTEIN (CENTRUM SILVER ORAL) Take by mouth once daily.       levetiracetam (KEPPRA) 750 MG Tab Take 750 mg by mouth 2 (two) times daily.  12    memantine (NAMENDA) 5 MG Tab 1 tablet once daily.      pantoprazole (PROTONIX) 40 MG tablet TAKE 1 BY MOUTH ONCE A DAY  12    phenytoin (DILANTIN) 100 MG ER capsule 2 capsules by mouth every morning and 3 capsules by mouth every evening  2    silver sulfADIAZINE 1% (SILVADENE) 1 % cream Apply topically once daily. 50 g 1     No current facility-administered medications for this visit.          ROS  Review of Systems   Constitutional: Negative for activity change, appetite change, chills and fatigue.   HENT: Negative for congestion, ear discharge, hearing loss, mouth sores, rhinorrhea, sinus pain and trouble swallowing.    Eyes: Negative for photophobia, pain, discharge and visual disturbance.   Respiratory: Negative for cough, chest tightness, shortness of breath and wheezing.    Cardiovascular: Negative for chest pain, palpitations and leg swelling.   Gastrointestinal: Negative for abdominal distention, abdominal pain, blood in stool, constipation, diarrhea, nausea and vomiting.   Genitourinary: Positive for hematuria. Negative for difficulty urinating, dysuria and flank pain.   Musculoskeletal: Negative for arthralgias, back pain, gait problem, joint swelling and myalgias.   Skin: Negative for color change and rash.   Neurological: Negative for dizziness, tremors, speech difficulty, light-headedness, numbness and headaches.   Psychiatric/Behavioral: Negative for behavioral problems, confusion, hallucinations, sleep disturbance and suicidal ideas.           PHYSICAL EXAM  Vitals:    07/27/19 1109   BP: 136/66   Pulse: 95   Temp: 98.3 °F (36.8 °C)   SpO2: 97%   Weight: 88.3 kg (194 lb 8.9 oz)   Height: 6' 0.5" (1.842 m)    Body mass index is 26.02 kg/m².  Weight: 88.3 kg (194 lb 8.9 oz)   Height: 6' 0.5" (184.2 cm)     Physical " Exam   Constitutional: He appears well-developed.   HENT:   Head: Normocephalic.   Eyes: Conjunctivae are normal.   Neck: Normal range of motion. Neck supple.   Cardiovascular: Normal rate and regular rhythm.   Pulmonary/Chest: Effort normal and breath sounds normal.   Neurological: He is alert.   Skin: Skin is warm.   Psychiatric: He has a normal mood and affect.   Nursing note and vitals reviewed.        Health Maintenance       Date Due Completion Date    Shingles Vaccine (1 of 2) 07/27/2020 (Originally 8/16/1983) ---    Influenza Vaccine 08/01/2019 12/3/2018    Foot Exam 12/03/2019 12/3/2018 (Done)    Override on 12/3/2018: Done (sees foot docotor all the time   saw last week)    Override on 11/2/2017: Done (dr rodgers)    Override on 10/24/2016: Done    Lipid Panel 05/06/2021 5/6/2016    TETANUS VACCINE 07/30/2026 7/30/2016            Assessment & Plan      Karthikeyan was seen today for hospital follow up.    Diagnoses and all orders for this visit:    Gross hematuria  -     Urinalysis w/reflex to Microscopic; Future  -     CBC auto differential; Future  -     Basic metabolic panel; Future  -     Urinalysis w/reflex to Microscopic  -     CBC auto differential  -     Basic metabolic panel    Constipation, unspecified constipation type          Follow-up: No follow-ups on file.

## 2019-07-29 ENCOUNTER — CLINICAL SUPPORT (OUTPATIENT)
Dept: FAMILY MEDICINE | Facility: CLINIC | Age: 84
End: 2019-07-29
Payer: MEDICARE

## 2019-07-29 DIAGNOSIS — R31.0 GROSS HEMATURIA: Primary | ICD-10-CM

## 2019-07-29 LAB
BILIRUB UR QL STRIP: NEGATIVE
CLARITY UR REFRACT.AUTO: CLEAR
COLOR UR AUTO: YELLOW
GLUCOSE UR QL STRIP: NEGATIVE
HGB UR QL STRIP: NEGATIVE
KETONES UR QL STRIP: NEGATIVE
LEUKOCYTE ESTERASE UR QL STRIP: NEGATIVE
MICROSCOPIC COMMENT: NORMAL
NITRITE UR QL STRIP: NEGATIVE
PH UR STRIP: 6 [PH] (ref 5–8)
PROT UR QL STRIP: NEGATIVE
SP GR UR STRIP: 1.02 (ref 1–1.03)
URN SPEC COLLECT METH UR: NORMAL

## 2019-07-29 PROCEDURE — 81001 URINALYSIS AUTO W/SCOPE: CPT

## 2019-07-30 LAB
BASOPHILS # BLD AUTO: 51 CELLS/UL (ref 0–200)
BASOPHILS NFR BLD AUTO: 0.7 %
BUN SERPL-MCNC: 19 MG/DL (ref 7–25)
BUN/CREAT SERPL: ABNORMAL (CALC) (ref 6–22)
CALCIUM SERPL-MCNC: 8.9 MG/DL (ref 8.6–10.3)
CHLORIDE SERPL-SCNC: 105 MMOL/L (ref 98–110)
CO2 SERPL-SCNC: 27 MMOL/L (ref 20–32)
CREAT SERPL-MCNC: 0.91 MG/DL (ref 0.7–1.11)
EOSINOPHIL # BLD AUTO: 131 CELLS/UL (ref 15–500)
EOSINOPHIL NFR BLD AUTO: 1.8 %
ERYTHROCYTE [DISTWIDTH] IN BLOOD BY AUTOMATED COUNT: 13.3 % (ref 11–15)
GFRSERPLBLD MDRD-ARVRAT: 77 ML/MIN/1.73M2
GLUCOSE SERPL-MCNC: 107 MG/DL (ref 65–99)
HCT VFR BLD AUTO: 38.4 % (ref 38.5–50)
HGB BLD-MCNC: 13.1 G/DL (ref 13.2–17.1)
LYMPHOCYTES # BLD AUTO: 1964 CELLS/UL (ref 850–3900)
LYMPHOCYTES NFR BLD AUTO: 26.9 %
MCH RBC QN AUTO: 31.9 PG (ref 27–33)
MCHC RBC AUTO-ENTMCNC: 34.1 G/DL (ref 32–36)
MCV RBC AUTO: 93.4 FL (ref 80–100)
MONOCYTES # BLD AUTO: 628 CELLS/UL (ref 200–950)
MONOCYTES NFR BLD AUTO: 8.6 %
NEUTROPHILS # BLD AUTO: 4526 CELLS/UL (ref 1500–7800)
NEUTROPHILS NFR BLD AUTO: 62 %
PLATELET # BLD AUTO: 197 THOUSAND/UL (ref 140–400)
PMV BLD REES-ECKER: 9.3 FL (ref 7.5–12.5)
POTASSIUM SERPL-SCNC: 4.3 MMOL/L (ref 3.5–5.3)
RBC # BLD AUTO: 4.11 MILLION/UL (ref 4.2–5.8)
SODIUM SERPL-SCNC: 139 MMOL/L (ref 135–146)
WBC # BLD AUTO: 7.3 THOUSAND/UL (ref 3.8–10.8)

## 2019-08-02 ENCOUNTER — PATIENT MESSAGE (OUTPATIENT)
Dept: FAMILY MEDICINE | Facility: CLINIC | Age: 84
End: 2019-08-02

## 2019-08-02 RX ORDER — GABAPENTIN 100 MG/1
CAPSULE ORAL
Qty: 90 CAPSULE | Refills: 11 | Status: SHIPPED | OUTPATIENT
Start: 2019-08-02 | End: 2020-01-23 | Stop reason: SDUPTHER

## 2019-08-07 ENCOUNTER — PATIENT MESSAGE (OUTPATIENT)
Dept: FAMILY MEDICINE | Facility: CLINIC | Age: 84
End: 2019-08-07

## 2019-09-25 ENCOUNTER — TELEPHONE (OUTPATIENT)
Dept: FAMILY MEDICINE | Facility: CLINIC | Age: 84
End: 2019-09-25

## 2019-09-25 NOTE — TELEPHONE ENCOUNTER
----- Message from Mone Serrano sent at 9/25/2019  1:12 PM CDT -----  Contact: Emigdio pt - with [facility]  - [phone]  Needs another 2 weeks for home health 2 times a week. Please advise     FYMICHEL  I called and Left message with details for Emigdio that is it ok to continue/extend HH services as requested  I advised him to rtn call with any questions

## 2020-01-23 RX ORDER — GABAPENTIN 100 MG/1
CAPSULE ORAL
Qty: 270 CAPSULE | Refills: 3 | Status: SHIPPED | OUTPATIENT
Start: 2020-01-23

## 2020-02-12 ENCOUNTER — PATIENT MESSAGE (OUTPATIENT)
Dept: FAMILY MEDICINE | Facility: CLINIC | Age: 85
End: 2020-02-12

## 2020-02-12 ENCOUNTER — TELEPHONE (OUTPATIENT)
Dept: FAMILY MEDICINE | Facility: CLINIC | Age: 85
End: 2020-02-12

## 2020-02-12 NOTE — TELEPHONE ENCOUNTER
Pt sent portal message requesting an appt next week    Preferred Date Range: 2/20/2020 - 2/21/2020      Preferred Times: Monday Afternoon, Tuesday Afternoon, Wednesday Afternoon, Thursday Afternoon, Friday Afternoon      Reason for visit: Post hospital follow-up      Comments:   To update Dr. Regalado on recent hospital admissions and to discuss medication for high thyroid per recent bloodwork       I don't have anything until first week in March  Are you ok with that    It looks like the pt was recently in [facility] and sent home with IV abx  I don't see any labs in care everywhere     Please advise

## 2020-02-12 NOTE — TELEPHONE ENCOUNTER
FYI    Mental status issues prior to hospital stay. Patient's wife does have lab results from [facility]. Patient has been placed on Thyroid medication. Patient went into A-fib while in the ER on 01/27/2020. Wife is afraid to start synthroid because she's afraid it will affect his heart. Patient has been scheduled for hospital f/u on 02/21/2020 as requested.

## 2020-02-21 ENCOUNTER — TELEPHONE (OUTPATIENT)
Dept: FAMILY MEDICINE | Facility: CLINIC | Age: 85
End: 2020-02-21

## 2020-02-21 NOTE — TELEPHONE ENCOUNTER
It was an fyi for you     Lizzette, I cancelled todays appointment because I was admitted to [facility] last night. My heel ulcer took a turn for the worse again.  I will have Josie pull together bloodwork that was done at [facility] and a brief narrative of whats going on and drop it off for Dr. Regalado - probably on Monday.   I will be here until at least Monday.   ----- Message -----   From: Nurse Lizzette MURRY   Sent: 2/12/2020  2:12 PM CST   To: Karthikeyan Marshall   Subject: RE: Appointment Request   Good Afternoon.    Do you have a copy of the lab results? Did he have them at [facility]?   I tried looking for them but I could not find them in their system. Dr Blunt's scheduled   Is really full but if you have a copy can you please send a copy for him to review and   Then I can get back with you about an appointment. If Dr Blunt is ok with awaiting could    yall come in the first week of March?   Lizzette      ----- Message -----      From: Karthikeyan Marshall      Sent: 2/12/2020 12:21 PM CST        To: Patient Appointment Schedule Request Mailing List   Subject: Appointment Request      Appointment Request From: Karthikeyan Marshall      With Provider: Cy Blunt MD [[facility]]      Preferred Date Range: 2/20/2020 - 2/21/2020      Preferred Times: Monday Afternoon, Tuesday Afternoon, Wednesday Afternoon, Thursday Afternoon, Friday Afternoon      Reason for visit: Post hospital follow-up      Comments:   To update Dr. Regalado on recent hospital admissions and to discuss medication for high thyroid per recent bloodwork

## 2020-02-25 ENCOUNTER — PATIENT MESSAGE (OUTPATIENT)
Dept: FAMILY MEDICINE | Facility: CLINIC | Age: 85
End: 2020-02-25

## 2020-02-28 ENCOUNTER — OFFICE VISIT (OUTPATIENT)
Dept: FAMILY MEDICINE | Facility: CLINIC | Age: 85
End: 2020-02-28
Payer: MEDICARE

## 2020-02-28 VITALS
OXYGEN SATURATION: 95 % | BODY MASS INDEX: 25.05 KG/M2 | WEIGHT: 184.94 LBS | SYSTOLIC BLOOD PRESSURE: 112 MMHG | HEIGHT: 72 IN | HEART RATE: 79 BPM | TEMPERATURE: 98 F | DIASTOLIC BLOOD PRESSURE: 52 MMHG

## 2020-02-28 DIAGNOSIS — G62.9 PERIPHERAL POLYNEUROPATHY: ICD-10-CM

## 2020-02-28 DIAGNOSIS — R26.9 GAIT DISTURBANCE: Primary | ICD-10-CM

## 2020-02-28 DIAGNOSIS — I10 ESSENTIAL HYPERTENSION: ICD-10-CM

## 2020-02-28 DIAGNOSIS — E11.40 TYPE 2 DIABETES MELLITUS WITH DIABETIC NEUROPATHY, WITHOUT LONG-TERM CURRENT USE OF INSULIN: ICD-10-CM

## 2020-02-28 PROCEDURE — 1159F MED LIST DOCD IN RCRD: CPT | Mod: S$GLB,,, | Performed by: FAMILY MEDICINE

## 2020-02-28 PROCEDURE — 99213 OFFICE O/P EST LOW 20 MIN: CPT | Mod: S$GLB,,, | Performed by: FAMILY MEDICINE

## 2020-02-28 PROCEDURE — 1159F PR MEDICATION LIST DOCUMENTED IN MEDICAL RECORD: ICD-10-PCS | Mod: S$GLB,,, | Performed by: FAMILY MEDICINE

## 2020-02-28 PROCEDURE — 1101F PT FALLS ASSESS-DOCD LE1/YR: CPT | Mod: S$GLB,,, | Performed by: FAMILY MEDICINE

## 2020-02-28 PROCEDURE — 1101F PR PT FALLS ASSESS DOC 0-1 FALLS W/OUT INJ PAST YR: ICD-10-PCS | Mod: S$GLB,,, | Performed by: FAMILY MEDICINE

## 2020-02-28 PROCEDURE — 99213 PR OFFICE/OUTPT VISIT, EST, LEVL III, 20-29 MIN: ICD-10-PCS | Mod: S$GLB,,, | Performed by: FAMILY MEDICINE

## 2020-02-28 RX ORDER — LINEZOLID 600 MG/1
600 TABLET, FILM COATED ORAL EVERY 12 HOURS
COMMUNITY
End: 2020-12-17

## 2020-02-28 RX ORDER — CEFUROXIME AXETIL 250 MG/1
250 TABLET ORAL 2 TIMES DAILY
COMMUNITY
End: 2020-12-17

## 2020-02-28 RX ORDER — IRBESARTAN 75 MG/1
75 TABLET ORAL NIGHTLY
COMMUNITY
End: 2020-12-18

## 2020-02-28 RX ORDER — METOPROLOL SUCCINATE 25 MG/1
25 TABLET, EXTENDED RELEASE ORAL DAILY
COMMUNITY
End: 2020-12-17

## 2020-03-01 NOTE — PROGRESS NOTES
Patient ID: Karthikeyan Marshall is a 86 y.o. male.    Chief Complaint: Hospital Follow Up    HPI      Karthikeyan Marshall is a 86 y.o. male here for follow-up regarding multiple medical problems including admission to the hospital and lower extremity wound.  Hypertension-under control.  Fall with subdural hematoma and craniotomy-continues to have minimal residual problems.  Reflux-stable no problems  Type 2 diabetes-under control  Peripheral neuropathy-no new problems      Vitals:    02/28/20 1436   BP: (!) 112/52   Pulse: 79   Temp: 98.4 °F (36.9 °C)   SpO2: 95%   Weight: 83.9 kg (184 lb 15.5 oz)   Height: 6' (1.829 m)            Review of Symptoms    Constitutional  activity stable this time, No chills /fever   Resp  Neg hemoptysis, stridor, choking  CVS  Neg chest pain, palpitations    Physical Exam    Constitutional:  Oriented to person, place, and time.appears well-developed and well-nourished.  No distress.      HENT  Head: Normocephalic and atraumatic  Right Ear: External ear normal.   Left Ear: External ear normal.   Nose: External nose normal.   Mouth:  Moist mucus membranes.    Eyes:  Conjunctivae are normal. Right eye exhibits no discharge.  Left eye exhibits no discharge. No scleral icterus.  No periorbital edema    Cardiovascular:  Regular rate and rhythm with normal S1 and S2     Pulmonary/Chest:   Clear to auscultation bilaterally without wheezes, rhonchi or rales      Musculoskeletal:  No edema. No obvious deformity No wasting  Walks with walker      Neurological:  Alert and oriented to person, place, and time.   Coordination normal.     Skin:   Skin is warm and dry.  No diaphoresis.   No rash noted.     Psychiatric: Normal mood and affect. Behavior is normal.  Judgment and thought content normal.     Complete Blood Count  Lab Results   Component Value Date    RBC 4.11 (L) 07/29/2019    HGB 13.1 (L) 07/29/2019    HCT 38.4 (L) 07/29/2019    MCV 93.4 07/29/2019    MCH 31.9 07/29/2019    MCHC 34.1 07/29/2019    RDW  13.3 07/29/2019     07/29/2019    MPV 9.3 07/29/2019    GRAN 4.9 05/06/2016    GRAN 56.1 05/06/2016    LYMPH 1,964 07/29/2019    LYMPH 26.9 07/29/2019    MONO 628 07/29/2019    MONO 8.6 07/29/2019     07/29/2019    BASO 51 07/29/2019    EOSINOPHIL 1.8 07/29/2019    BASOPHIL 0.7 07/29/2019    DIFFMETHOD Automated 05/06/2016       Comprehensive Metabolic Panel  No results found for: GLU, BUN, CREATININE, NA, K, CL, PROT, ALBUMIN, BILITOT, AST, ALKPHOS, CO2, ALT, ANIONGAP, EGFRNONAA, ESTGFRAFRICA    TSH  No results found for: TSH    Assessment / Plan:      ICD-10-CM ICD-9-CM   1. Gait disturbance R26.9 781.2   2. Type 2 diabetes mellitus with diabetic neuropathy, without long-term current use of insulin E11.40 250.60     357.2   3. Essential hypertension I10 401.9   4. Peripheral polyneuropathy G62.9 356.9     Gait disturbance    Type 2 diabetes mellitus with diabetic neuropathy, without long-term current use of insulin    Essential hypertension    Peripheral polyneuropathy     Reviewed previous admissions-current therapy-no changes need to be made this time-continue follow-up with wound care  Continue strengthening exercises at home  Continue antibiotics

## 2020-03-13 ENCOUNTER — PATIENT MESSAGE (OUTPATIENT)
Dept: FAMILY MEDICINE | Facility: CLINIC | Age: 85
End: 2020-03-13

## 2020-03-13 DIAGNOSIS — E03.9 HYPOTHYROIDISM, UNSPECIFIED TYPE: ICD-10-CM

## 2020-03-13 DIAGNOSIS — E11.40 TYPE 2 DIABETES MELLITUS WITH DIABETIC NEUROPATHY, WITHOUT LONG-TERM CURRENT USE OF INSULIN: Primary | ICD-10-CM

## 2020-03-13 DIAGNOSIS — I10 ESSENTIAL HYPERTENSION: ICD-10-CM

## 2020-04-06 ENCOUNTER — PATIENT MESSAGE (OUTPATIENT)
Dept: FAMILY MEDICINE | Facility: CLINIC | Age: 85
End: 2020-04-06

## 2020-04-06 RX ORDER — LEVOTHYROXINE SODIUM 25 UG/1
25 TABLET ORAL
Qty: 90 TABLET | Refills: 3 | Status: SHIPPED | OUTPATIENT
Start: 2020-04-06 | End: 2020-12-18

## 2020-04-07 ENCOUNTER — PATIENT MESSAGE (OUTPATIENT)
Dept: FAMILY MEDICINE | Facility: CLINIC | Age: 85
End: 2020-04-07

## 2020-05-19 ENCOUNTER — HOSPITAL ENCOUNTER (OUTPATIENT)
Facility: HOSPITAL | Age: 85
Discharge: HOME | End: 2020-05-20
Attending: EMERGENCY MEDICINE | Admitting: EMERGENCY MEDICINE
Payer: MEDICARE

## 2020-05-19 DIAGNOSIS — R79.89 ELEVATED TROPONIN: ICD-10-CM

## 2020-05-19 DIAGNOSIS — R50.9 FEVER, UNSPECIFIED FEVER CAUSE: ICD-10-CM

## 2020-05-19 DIAGNOSIS — R07.9 CHEST PAIN: ICD-10-CM

## 2020-05-19 DIAGNOSIS — R79.89 ELEVATED TROPONIN: Primary | ICD-10-CM

## 2020-05-19 LAB
ABO + RH BLD: NORMAL
ALBUMIN SERPL BCP-MCNC: 3.7 G/DL (ref 3.5–5.2)
ALP SERPL-CCNC: 150 U/L (ref 55–135)
ALT SERPL W/O P-5'-P-CCNC: 27 U/L (ref 10–44)
ANION GAP SERPL CALC-SCNC: 6 MMOL/L (ref 8–16)
AST SERPL-CCNC: 25 U/L (ref 10–40)
BACTERIA #/AREA URNS HPF: NORMAL /HPF
BASOPHILS # BLD AUTO: 0.03 K/UL (ref 0–0.2)
BASOPHILS NFR BLD: 0.3 % (ref 0–1.9)
BILIRUB SERPL-MCNC: 0.3 MG/DL (ref 0.1–1)
BILIRUB UR QL STRIP: NEGATIVE
BLD GP AB SCN CELLS X3 SERPL QL: NORMAL
BNP SERPL-MCNC: 377 PG/ML (ref 0–99)
BUN SERPL-MCNC: 26 MG/DL (ref 8–23)
CALCIUM SERPL-MCNC: 9.2 MG/DL (ref 8.7–10.5)
CHLORIDE SERPL-SCNC: 107 MMOL/L (ref 95–110)
CLARITY UR: CLEAR
CO2 SERPL-SCNC: 27 MMOL/L (ref 23–29)
COLOR UR: YELLOW
CREAT SERPL-MCNC: 1 MG/DL (ref 0.5–1.4)
DIFFERENTIAL METHOD: ABNORMAL
EOSINOPHIL # BLD AUTO: 0 K/UL (ref 0–0.5)
EOSINOPHIL NFR BLD: 0.3 % (ref 0–8)
ERYTHROCYTE [DISTWIDTH] IN BLOOD BY AUTOMATED COUNT: 15.9 % (ref 11.5–14.5)
EST. GFR  (AFRICAN AMERICAN): >60 ML/MIN/1.73 M^2
EST. GFR  (NON AFRICAN AMERICAN): >60 ML/MIN/1.73 M^2
GLUCOSE SERPL-MCNC: 129 MG/DL (ref 70–110)
GLUCOSE UR QL STRIP: NEGATIVE
HCT VFR BLD AUTO: 34.4 % (ref 40–54)
HGB BLD-MCNC: 11.3 G/DL (ref 14–18)
HGB UR QL STRIP: NEGATIVE
HYALINE CASTS #/AREA URNS LPF: 1 /LPF
IMM GRANULOCYTES # BLD AUTO: 0.05 K/UL (ref 0–0.04)
IMM GRANULOCYTES NFR BLD AUTO: 0.4 % (ref 0–0.5)
KETONES UR QL STRIP: NEGATIVE
LACTATE SERPL-SCNC: 1.2 MMOL/L (ref 0.5–2.2)
LEUKOCYTE ESTERASE UR QL STRIP: ABNORMAL
LYMPHOCYTES # BLD AUTO: 1.3 K/UL (ref 1–4.8)
LYMPHOCYTES NFR BLD: 10.9 % (ref 18–48)
MCH RBC QN AUTO: 31 PG (ref 27–31)
MCHC RBC AUTO-ENTMCNC: 32.8 G/DL (ref 32–36)
MCV RBC AUTO: 94 FL (ref 82–98)
MICROSCOPIC COMMENT: NORMAL
MONOCYTES # BLD AUTO: 0.9 K/UL (ref 0.3–1)
MONOCYTES NFR BLD: 7.5 % (ref 4–15)
NEUTROPHILS # BLD AUTO: 9.3 K/UL (ref 1.8–7.7)
NEUTROPHILS NFR BLD: 80.6 % (ref 38–73)
NITRITE UR QL STRIP: NEGATIVE
NRBC BLD-RTO: 0 /100 WBC
PH UR STRIP: 6 [PH] (ref 5–8)
PLATELET # BLD AUTO: 173 K/UL (ref 150–350)
PMV BLD AUTO: 9.5 FL (ref 9.2–12.9)
POTASSIUM SERPL-SCNC: 4.3 MMOL/L (ref 3.5–5.1)
PROT SERPL-MCNC: 6.7 G/DL (ref 6–8.4)
PROT UR QL STRIP: NEGATIVE
RBC # BLD AUTO: 3.65 M/UL (ref 4.6–6.2)
RBC #/AREA URNS HPF: 1 /HPF (ref 0–4)
SARS-COV-2 RDRP RESP QL NAA+PROBE: NEGATIVE
SODIUM SERPL-SCNC: 140 MMOL/L (ref 136–145)
SP GR UR STRIP: 1.02 (ref 1–1.03)
SQUAMOUS #/AREA URNS HPF: 2 /HPF
TROPONIN I SERPL DL<=0.01 NG/ML-MCNC: 0.01 NG/ML (ref 0–0.03)
TROPONIN I SERPL DL<=0.01 NG/ML-MCNC: 0.07 NG/ML (ref 0–0.03)
URN SPEC COLLECT METH UR: ABNORMAL
UROBILINOGEN UR STRIP-ACNC: NEGATIVE EU/DL
WBC # BLD AUTO: 11.56 K/UL (ref 3.9–12.7)
WBC #/AREA URNS HPF: 5 /HPF (ref 0–5)

## 2020-05-19 PROCEDURE — 96365 THER/PROPH/DIAG IV INF INIT: CPT

## 2020-05-19 PROCEDURE — 63600175 PHARM REV CODE 636 W HCPCS: Performed by: EMERGENCY MEDICINE

## 2020-05-19 PROCEDURE — 87040 BLOOD CULTURE FOR BACTERIA: CPT

## 2020-05-19 PROCEDURE — 85025 COMPLETE CBC W/AUTO DIFF WBC: CPT

## 2020-05-19 PROCEDURE — 86901 BLOOD TYPING SEROLOGIC RH(D): CPT

## 2020-05-19 PROCEDURE — 93005 ELECTROCARDIOGRAM TRACING: CPT

## 2020-05-19 PROCEDURE — 93010 EKG 12-LEAD: ICD-10-PCS | Mod: ,,, | Performed by: INTERNAL MEDICINE

## 2020-05-19 PROCEDURE — 80053 COMPREHEN METABOLIC PANEL: CPT

## 2020-05-19 PROCEDURE — 25500020 PHARM REV CODE 255: Performed by: EMERGENCY MEDICINE

## 2020-05-19 PROCEDURE — 96361 HYDRATE IV INFUSION ADD-ON: CPT

## 2020-05-19 PROCEDURE — 25000003 PHARM REV CODE 250: Performed by: EMERGENCY MEDICINE

## 2020-05-19 PROCEDURE — 93010 ELECTROCARDIOGRAM REPORT: CPT | Mod: ,,, | Performed by: INTERNAL MEDICINE

## 2020-05-19 PROCEDURE — 99285 EMERGENCY DEPT VISIT HI MDM: CPT | Mod: 25

## 2020-05-19 PROCEDURE — 84484 ASSAY OF TROPONIN QUANT: CPT

## 2020-05-19 PROCEDURE — 83605 ASSAY OF LACTIC ACID: CPT

## 2020-05-19 PROCEDURE — U0002 COVID-19 LAB TEST NON-CDC: HCPCS

## 2020-05-19 PROCEDURE — 81000 URINALYSIS NONAUTO W/SCOPE: CPT

## 2020-05-19 PROCEDURE — 83880 ASSAY OF NATRIURETIC PEPTIDE: CPT

## 2020-05-19 PROCEDURE — G0378 HOSPITAL OBSERVATION PER HR: HCPCS

## 2020-05-19 RX ORDER — ACETAMINOPHEN 500 MG
1000 TABLET ORAL
Status: COMPLETED | OUTPATIENT
Start: 2020-05-19 | End: 2020-05-19

## 2020-05-19 RX ORDER — PHENYTOIN SODIUM 100 MG/1
200 CAPSULE, EXTENDED RELEASE ORAL 2 TIMES DAILY
Status: DISCONTINUED | OUTPATIENT
Start: 2020-05-20 | End: 2020-05-20 | Stop reason: HOSPADM

## 2020-05-19 RX ORDER — LEVOTHYROXINE SODIUM 25 UG/1
25 TABLET ORAL
Status: DISCONTINUED | OUTPATIENT
Start: 2020-05-20 | End: 2020-05-20 | Stop reason: HOSPADM

## 2020-05-19 RX ORDER — CLOPIDOGREL BISULFATE 75 MG/1
75 TABLET ORAL DAILY
Status: DISCONTINUED | OUTPATIENT
Start: 2020-05-20 | End: 2020-05-20 | Stop reason: HOSPADM

## 2020-05-19 RX ORDER — ASPIRIN 325 MG
325 TABLET ORAL
Status: DISCONTINUED | OUTPATIENT
Start: 2020-05-19 | End: 2020-05-19

## 2020-05-19 RX ORDER — ATORVASTATIN CALCIUM 20 MG/1
20 TABLET, FILM COATED ORAL DAILY
Status: DISCONTINUED | OUTPATIENT
Start: 2020-05-20 | End: 2020-05-20 | Stop reason: HOSPADM

## 2020-05-19 RX ORDER — METOPROLOL SUCCINATE 25 MG/1
25 TABLET, EXTENDED RELEASE ORAL DAILY
Status: DISCONTINUED | OUTPATIENT
Start: 2020-05-20 | End: 2020-05-20 | Stop reason: HOSPADM

## 2020-05-19 RX ORDER — SODIUM CHLORIDE 0.9 % (FLUSH) 0.9 %
10 SYRINGE (ML) INJECTION
Status: DISCONTINUED | OUTPATIENT
Start: 2020-05-20 | End: 2020-05-20 | Stop reason: HOSPADM

## 2020-05-19 RX ORDER — MEMANTINE HYDROCHLORIDE 5 MG/1
5 TABLET ORAL DAILY
Status: DISCONTINUED | OUTPATIENT
Start: 2020-05-20 | End: 2020-05-20 | Stop reason: HOSPADM

## 2020-05-19 RX ADMIN — CEFTRIAXONE 1 G: 1 INJECTION, SOLUTION INTRAVENOUS at 10:05

## 2020-05-19 RX ADMIN — ACETAMINOPHEN 1000 MG: 500 TABLET ORAL at 08:05

## 2020-05-19 RX ADMIN — SODIUM CHLORIDE 1000 ML: 0.9 INJECTION, SOLUTION INTRAVENOUS at 08:05

## 2020-05-19 RX ADMIN — IOHEXOL 100 ML: 350 INJECTION, SOLUTION INTRAVENOUS at 09:05

## 2020-05-20 VITALS
SYSTOLIC BLOOD PRESSURE: 133 MMHG | HEIGHT: 72 IN | DIASTOLIC BLOOD PRESSURE: 88 MMHG | TEMPERATURE: 98 F | OXYGEN SATURATION: 100 % | HEART RATE: 70 BPM | BODY MASS INDEX: 25.08 KG/M2 | WEIGHT: 185.19 LBS | RESPIRATION RATE: 18 BRPM

## 2020-05-20 PROBLEM — R07.9 CHEST PAIN: Chronic | Status: ACTIVE | Noted: 2020-05-19

## 2020-05-20 LAB
TROPONIN I SERPL DL<=0.01 NG/ML-MCNC: 0.06 NG/ML (ref 0–0.03)
TROPONIN I SERPL DL<=0.01 NG/ML-MCNC: 0.1 NG/ML (ref 0–0.03)

## 2020-05-20 PROCEDURE — 93005 ELECTROCARDIOGRAM TRACING: CPT

## 2020-05-20 PROCEDURE — G0378 HOSPITAL OBSERVATION PER HR: HCPCS

## 2020-05-20 PROCEDURE — 84484 ASSAY OF TROPONIN QUANT: CPT

## 2020-05-20 PROCEDURE — 93010 ELECTROCARDIOGRAM REPORT: CPT | Mod: ,,, | Performed by: INTERNAL MEDICINE

## 2020-05-20 PROCEDURE — 93010 EKG 12-LEAD: ICD-10-PCS | Mod: ,,, | Performed by: INTERNAL MEDICINE

## 2020-05-20 PROCEDURE — 25000003 PHARM REV CODE 250: Performed by: EMERGENCY MEDICINE

## 2020-05-20 RX ADMIN — LEVETIRACETAM 750 MG: 500 TABLET, FILM COATED ORAL at 09:05

## 2020-05-20 RX ADMIN — CLOPIDOGREL BISULFATE 75 MG: 75 TABLET ORAL at 09:05

## 2020-05-20 RX ADMIN — METOPROLOL SUCCINATE 25 MG: 25 TABLET, EXTENDED RELEASE ORAL at 09:05

## 2020-05-20 RX ADMIN — LEVOTHYROXINE SODIUM 25 MCG: 25 TABLET ORAL at 06:05

## 2020-05-20 RX ADMIN — MEMANTINE HYDROCHLORIDE 5 MG: 5 TABLET ORAL at 09:05

## 2020-05-20 RX ADMIN — ATORVASTATIN CALCIUM 20 MG: 20 TABLET, FILM COATED ORAL at 09:05

## 2020-05-20 RX ADMIN — PHENYTOIN SODIUM 200 MG: 100 CAPSULE, EXTENDED RELEASE ORAL at 09:05

## 2020-05-20 NOTE — ED TRIAGE NOTES
Patient presents to the ED via [facility] from home. Wife reports patient with chills, shortness of breath, malaise that started this afternoon. Wife states patient had a 101.4 temperature at home and was tachycardic and hypertensive. EMS provided 324 of aspirin and x 3 sprays of nitroglycerin. Code STEMI called prior to arrival per EMS and Dr. Reynoso.      Review of patient's allergies indicates:   Allergen Reactions    Adhesive     Adhesive tape-silicones      Other reaction(s): rash with most tapes. ok if use paer tape    Bactrim [sulfamethoxazole-trimethoprim]         Patient has verified the spelling of their name and  on armband.   APPEARANCE: Patient is alert, calm, and does not appear distressed.  SKIN: Skin is normal for race, warm, and dry. Normal skin turgor and mucous membranes moist. +Fever. +Chills  CARDIAC: Rate is tachycardic, +Chest pain, no murmur heard.   RESPIRATORY:Normal rate and effort. Breath sounds clear bilaterally throughout chest. Respirations are equal and unlabored. Wife reports dyspnea on exertion.   GASTRO: Bowel sounds normal, abdomen is soft, no tenderness, and no abdominal distention.  MUSCLE: Full ROM. No bony tenderness or soft tissue tenderness. No obvious deformity. Generalized weakness.   PERIPHERAL VASCULAR: peripheral pulses present. Normal cap refill. No edema. Warm to touch.  MENTAL STATUS: awake, alert and aware of environment. Delayed to respond but is oriented x 3.  ENT: EARS: no obvious drainage. NOSE: no active bleeding. THROAT: no redness or swelling.

## 2020-05-20 NOTE — H&P
[facility]    History & Physical      Patient Name: Karthikeyan Marshall  MRN: [id number]  Admission Date: 5/19/2020  Attending Physician: Dr. Cisse  Primary Care Provider: Cy Blunt MD         Patient information was obtained from spouse/SO and ER records.     Subjective:     Principal Problem:<principal problem not specified>    Chief Complaint:   Chief Complaint   Patient presents with    Fever     Patient presents to the ED via [facility] from home. Wife reports patient with chills, shortness of breath, malaise that started this afternoon. Wife states patient had a 101.4 temperature at home and was tachycardic and hypertensive. EMS provided 324 of aspirin and x 3 sprays of nitroglycerin. Code STEMI called prior to arrival per EMS and Dr. Reynoso.      Shortness of Breath        HPI: 86-year-old male presents from home for chest pain and fever.  Presents via EMS as possible STEMI alert however patient has right bundle pattern to his EKG that is a STEMI mimic.  The patient's chief complaint is fever and chest pain.  Around 3:00 p.m. he started to have fever and chills as well as a pressure-like anterior chest pain nonradiating.  No shortness of breath.  No nausea vomiting.  No diaphoresis.  No recent cough.  No diarrhea.  No rash.  Workup in the ED revealed elevation 2 set troponin.  No obvious source of fever at this time, possible UTI.  CTA negative.  Was admitted for chest pain troponin evaluation.  Past Medical History:   Diagnosis Date    Cancer     Emphysema lung     GERD (gastroesophageal reflux disease)     Hyperlipidemia     Hypertension     Seizures     Type II or unspecified type diabetes mellitus without mention of complication, not stated as uncontrolled        Past Surgical History:   Procedure Laterality Date    APPENDECTOMY      BASAL CELL CARCINOMA EXCISION Left 02/01/2017    nose    CARDIAC CATHETERIZATION      COLONOSCOPY      2010    CORONARY ARTERY BYPASS  GRAFT      HERNIA REPAIR      LUNG CANCER SURGERY      PROSTATE SURGERY      TOE AMPUTATION Left     TONSILLECTOMY         Review of patient's allergies indicates:   Allergen Reactions    Adhesive     Adhesive tape-silicones      Other reaction(s): rash with most tapes. ok if use paer tape    Bactrim [sulfamethoxazole-trimethoprim]        No current facility-administered medications on file prior to encounter.      Current Outpatient Medications on File Prior to Encounter   Medication Sig    albuterol-ipratropium (DUO-NEB) 2.5 mg-0.5 mg/3 mL nebulizer solution NEBULIZE 1 VIAL 3 TIMES DAILY    ascorbic acid (VITAMIN C) 500 MG tablet Take 500 mg by mouth once daily.    atorvastatin (LIPITOR) 20 MG tablet     blood sugar diagnostic (BLOOD GLUCOSE TEST) Strp Strips for one to 2 times a day testing   dispense brand covered by insurance and to match meter brand    blood-glucose meter kit Dispense meter  brand covered by insurance    CALCIUM CARBONATE/VITAMIN D3 (VITAMIN D-3 ORAL) Take 5,000 Units by mouth once daily.     cefUROXime (CEFTIN) 250 MG tablet Take 250 mg by mouth 2 (two) times daily.    clopidogrel (PLAVIX) 75 mg tablet     diabetic supplies, [employer]. Misc Lancets for 1-2 times a day testing    dispense brand covered by insurance t    FOLIC ACID/MULTIVIT-MIN/LUTEIN (CENTRUM SILVER ORAL) Take by mouth once daily.     gabapentin (NEURONTIN) 100 MG capsule 1 tablet up to 3 times daily for neuropathy.  May cause sedation    irbesartan (AVAPRO) 75 MG tablet Take 75 mg by mouth every evening.    levetiracetam (KEPPRA) 750 MG Tab Take 750 mg by mouth 2 (two) times daily.    levothyroxine (SYNTHROID) 25 MCG tablet Take 1 tablet (25 mcg total) by mouth before breakfast.    linezolid (ZYVOX) 600 mg Tab Take 600 mg by mouth every 12 (twelve) hours.    memantine (NAMENDA) 5 MG Tab 1 tablet once daily.    metoprolol succinate (TOPROL-XL) 25 MG 24 hr tablet Take 25 mg by mouth once daily.     pantoprazole (PROTONIX) 40 MG tablet TAKE 1 BY MOUTH ONCE A DAY    phenytoin (DILANTIN) 100 MG ER capsule 2 capsules by mouth every morning and 3 capsules by mouth every evening     Family History     Problem Relation (Age of Onset)    Cancer Mother    Heart disease Father    Hypertension Father        Tobacco Use    Smoking status: Former Smoker    Smokeless tobacco: Never Used   Substance and Sexual Activity    Alcohol use: Yes    Drug use: No    Sexual activity: Not on file     Review of Systems   Constitutional: Positive for fatigue and fever.   Respiratory: Negative for shortness of breath.    Cardiovascular: Positive for chest pain.     Objective:     Vital Signs (Most Recent):  Temp: 99.3 °F (37.4 °C) (05/19/20 2156)  Pulse: 87 (05/19/20 2156)  Resp: 17 (05/19/20 2156)  BP: (!) 108/58 (05/19/20 2156)  SpO2: 100 % (05/19/20 2156) Vital Signs (24h Range):  Temp:  [99.3 °F (37.4 °C)-101.1 °F (38.4 °C)] 99.3 °F (37.4 °C)  Pulse:  [] 87  Resp:  [15-25] 17  SpO2:  [97 %-100 %] 100 %  BP: (108-116)/(53-63) 108/58     Weight: 83.5 kg (184 lb)  Body mass index is 24.95 kg/m².    Physical Exam   Constitutional: He is oriented to person, place, and time.   Elderly, chronically ill-appearing, no acute distress   HENT:   Head: Normocephalic and atraumatic.   Eyes: Pupils are equal, round, and reactive to light.   Neck: Normal range of motion.   Cardiovascular: Normal rate and regular rhythm.   Pulmonary/Chest: Breath sounds normal. No respiratory distress.   Abdominal: Soft. There is no tenderness. There is no guarding.   Musculoskeletal: Normal range of motion.   Neurological: He is alert and oriented to person, place, and time.   Skin: Skin is warm and dry. Capillary refill takes less than 2 seconds.   Psychiatric: He has a normal mood and affect.         CRANIAL NERVES     CN III, IV, VI   Pupils are equal, round, and reactive to light.      Significant Labs: All pertinent labs within the past 24 hours have  been reviewed.    Significant Imaging: I have reviewed and interpreted all pertinent imaging results/findings within the past 24 hours.    Assessment/Plan:     Active Diagnoses:    Diagnosis Date Noted POA    Elevated troponin [R79.89] 05/19/2020 Unknown    Fever [R50.9] 05/19/2020 Unknown    Chest pain [R07.9] 05/19/2020 Unknown      Problems Resolved During this Admission:     VTE Risk Mitigation (From admission, onward)         Ordered     IP VTE HIGH RISK PATIENT  Once      05/19/20 2339     Place sequential compression device  Until discontinued      05/19/20 2339              Elevated Troponin:   -came in as cardiac alert for right bundle-branch block with STEMI like appearance  -discussed with Cardiology does not meet STEMI criteria  -2nd troponin trending up, admitted to obs for cardiology evaluation, no active chest pain at this time  -repeat troponin at scheduled    Fever:  -no obvious source at this time   -COVID negative  -trace leukocyte esterase in urine  -COVID negative  -continue to observe    Edilberto Cisse MD  Department of Hospital Medicine   [facility]

## 2020-05-20 NOTE — CONSULTS
[facility] consult note    Consulting Physician: Santiago   Fellow: Vaibhav      Date of Admit: 5/19/2020    Chief Complaint     Fever and headache    Subjective:      History of Present Illness:  Karthikeyan Marshall is a 86 y.o. male who  has a past medical history of Cancer, Emphysema lung, GERD (gastroesophageal reflux disease), Hyperlipidemia, Hypertension, Seizures, and Type II or unspecified type diabetes mellitus without mention of complication, not stated as uncontrolled.. The patient presented to [facility] on 5/19/2020 with a primary complaint of Fever (Patient presents to the ED via [facility] from home. Wife reports patient with chills, shortness of breath, malaise that started this afternoon. Wife states patient had a 101.4 temperature at home and was tachycardic and hypertensive. EMS provided 324 of aspirin and x 3 sprays of nitroglycerin. Code STEMI called prior to arrival per EMS and Dr. Reynoso.  ) and Shortness of Breath    On  Review of EKG patient had a RBBB and did not meet criteria for stemi. He is pleasantly  demented and on questioning only mentions having a headache and feeling cold. He has a history of CABG x4  Documented in the EJ chart. He takes plavix as a home medication. History of PAD with LLE arthrectomy and PTA in 12/2019. He denies any cheat pain and shortness of breath.     Pertinent positives and negatives are listed in HPI.  All other systems are reviewed and are negative.    Past Medical History:  Past Medical History:   Diagnosis Date    Cancer     Emphysema lung     GERD (gastroesophageal reflux disease)     Hyperlipidemia     Hypertension     Seizures     Type II or unspecified type diabetes mellitus without mention of complication, not stated as uncontrolled        Past Surgical History:  Past Surgical History:   Procedure Laterality Date    APPENDECTOMY      BASAL CELL CARCINOMA EXCISION Left 02/01/2017    nose    CARDIAC CATHETERIZATION       COLONOSCOPY      2010    CORONARY ARTERY BYPASS GRAFT      HERNIA REPAIR      LUNG CANCER SURGERY      PROSTATE SURGERY      TOE AMPUTATION Left     TONSILLECTOMY         Allergies:  Review of patient's allergies indicates:   Allergen Reactions    Adhesive     Adhesive tape-silicones      Other reaction(s): rash with most tapes. ok if use paer tape    Bactrim [sulfamethoxazole-trimethoprim]        Home Medications:  Prior to Admission medications    Medication Sig Start Date End Date Taking? Authorizing Provider   albuterol-ipratropium (DUO-NEB) 2.5 mg-0.5 mg/3 mL nebulizer solution NEBULIZE 1 VIAL 3 TIMES DAILY 9/12/18   Historical Provider, MD   ascorbic acid (VITAMIN C) 500 MG tablet Take 500 mg by mouth once daily.    Historical Provider, MD   atorvastatin (LIPITOR) 20 MG tablet  5/26/19   Historical Provider, MD   blood sugar diagnostic (BLOOD GLUCOSE TEST) Strp Strips for one to 2 times a day testing   dispense brand covered by insurance and to match meter brand 3/7/19   Cy Blunt MD   blood-glucose meter kit Dispense meter  brand covered by insurance 12/3/18   Cy Blunt MD   CALCIUM CARBONATE/VITAMIN D3 (VITAMIN D-3 ORAL) Take 5,000 Units by mouth once daily.     Historical Provider, MD   cefUROXime (CEFTIN) 250 MG tablet Take 250 mg by mouth 2 (two) times daily.    Historical Provider, MD   clopidogrel (PLAVIX) 75 mg tablet  5/26/19   Historical Provider, MD   diabetic supplies, miscellan. Misc Lancets for 1-2 times a day testing    dispense brand covered by insurance t 3/7/19   Cy Blunt MD   FOLIC ACID/MULTIVIT-MIN/LUTEIN (CENTRUM SILVER ORAL) Take by mouth once daily.     Historical Provider, MD   gabapentin (NEURONTIN) 100 MG capsule 1 tablet up to 3 times daily for neuropathy.  May cause sedation 1/23/20   Cy Blunt MD   irbesartan (AVAPRO) 75 MG tablet Take 75 mg by mouth every evening.    Historical Provider, MD   levetiracetam (KEPPRA) 750 MG Tab  Take 750 mg by mouth 2 (two) times daily. 12/29/15   Historical Provider, MD   levothyroxine (SYNTHROID) 25 MCG tablet Take 1 tablet (25 mcg total) by mouth before breakfast. 20  Cy Blunt MD   linezolid (ZYVOX) 600 mg Tab Take 600 mg by mouth every 12 (twelve) hours.    Historical Provider, MD   memantine (NAMENDA) 5 MG Tab 1 tablet once daily. 18   Historical Provider, MD   metoprolol succinate (TOPROL-XL) 25 MG 24 hr tablet Take 25 mg by mouth once daily.    Historical Provider, MD   pantoprazole (PROTONIX) 40 MG tablet TAKE 1 BY MOUTH ONCE A DAY 17   Historical Provider, MD   phenytoin (DILANTIN) 100 MG ER capsule 2 capsules by mouth every morning and 3 capsules by mouth every evening 8/6/15   Historical Provider, MD       Family History:  Family History   Problem Relation Age of Onset    Cancer Mother     Hypertension Father     Heart disease Father        Social History:  Social History     Tobacco Use    Smoking status: Former Smoker    Smokeless tobacco: Never Used   Substance Use Topics    Alcohol use: Yes    Drug use: No       Review of Systems:  Pertinent items are noted in HPI. All other systems are reviewed and are negative.       Objective:   Last 24 Hour Vital Signs:  BP  Min: 100/57  Max: 149/63  Temp  Av.1 °F (37.3 °C)  Min: 97.5 °F (36.4 °C)  Max: 101.1 °F (38.4 °C)  Pulse  Av.7  Min: 62  Max: 118  Resp  Av.3  Min: 13  Max: 25  SpO2  Av.6 %  Min: 97 %  Max: 100 %  Height  Av' (182.9 cm)  Min: 6' (182.9 cm)  Max: 6' (182.9 cm)  Weight  Av.7 kg (184 lb 9.5 oz)  Min: 83.5 kg (184 lb)  Max: 84 kg (185 lb 3 oz)  Body mass index is 25.12 kg/m².  I/O last 3 completed shifts:  In: 1050 [IV Piggyback:1050]  Out: 400 [Urine:400]    Physical Examination:    General appearance: A&OX3, appears stated age and cooperative  HEENT: conjunctivae/corneas clear. PERRL, EOM's intact. Fundi benign.  Neck: supple, no adenopathy, no carotid bruit, no  JVD, trachea midline  Lungs: clear to auscultation bilaterally.No wheezes or crackles.   Heart: RRR, S1, S2 normal, no murmur, click, rub or gallop  Abdomen: soft, non-tender; bowel sounds normal; no masses, no organomegaly  Neuro: CN II-XII grossly normal. No focal motor or sensory deficit.   Extremities: Appears normal. 2+ pulse. FROM. No edema.  Skin: Normal turgor and color. No rashes or lesions      Laboratory:  Most Recent Data:  CBC:   Lab Results   Component Value Date    WBC 11.56 05/19/2020    HGB 11.3 (L) 05/19/2020    HCT 34.4 (L) 05/19/2020     05/19/2020    MCV 94 05/19/2020    RDW 15.9 (H) 05/19/2020     BMP:   Lab Results   Component Value Date     05/19/2020    K 4.3 05/19/2020     05/19/2020    CO2 27 05/19/2020    BUN 26 (H) 05/19/2020    CREATININE 1.0 05/19/2020     (H) 05/19/2020    CALCIUM 9.2 05/19/2020     LFTs:   Lab Results   Component Value Date    PROT 6.7 05/19/2020    ALBUMIN 3.7 05/19/2020    BILITOT 0.3 05/19/2020    AST 25 05/19/2020    ALKPHOS 150 (H) 05/19/2020    ALT 27 05/19/2020     Coags: No results found for: INR, PROTIME, PTT  FLP:   Lab Results   Component Value Date    CHOL 169 05/06/2016    HDL 68 05/06/2016    LDLCALC 65.2 05/06/2016    TRIG 179 (H) 05/06/2016    CHOLHDL 40.2 05/06/2016     DM:   Lab Results   Component Value Date    HGBA1C 5.5 12/04/2018    HGBA1C 6.0 05/06/2016    HGBA1C 6.3 (H) 09/03/2015    LDLCALC 65.2 05/06/2016    CREATININE 1.0 05/19/2020     Thyroid:   Lab Results   Component Value Date    TSH 3.138 05/06/2016     Anemia: No results found for: IRON, TIBC, FERRITIN, VBQTMRGL68, FOLATE  Cardiac:   Lab Results   Component Value Date    TROPONINI 0.096 (H) 05/20/2020     (H) 05/19/2020     Urinalysis:   Lab Results   Component Value Date    COLORU Yellow 05/19/2020    SPECGRAV 1.020 05/19/2020    NITRITE Negative 05/19/2020    KETONESU Negative 05/19/2020    UROBILINOGEN Negative 05/19/2020    WBCUA 5 05/19/2020        Trended Lab Data:  Recent Labs   Lab 05/19/20 1927   WBC 11.56   HGB 11.3*   HCT 34.4*      MCV 94   RDW 15.9*      K 4.3      CO2 27   BUN 26*   CREATININE 1.0   *   PROT 6.7   ALBUMIN 3.7   BILITOT 0.3   AST 25   ALKPHOS 150*   ALT 27       Trended Cardiac Data:  Recent Labs   Lab 05/19/20  1927 05/19/20  2300 05/20/20  0353   TROPONINI 0.015 0.070* 0.096*   *  --   --        Microbiology Data:  Blood and urine cultures pending       Other Results:  EKG (my interpretation): normal sinus rhythm, RBBB, 1st degree AV block.    Radiology:  Imaging Results           CTA Chest Non-Coronary (PE Study) (Final result)  Result time 05/19/20 21:52:30    Final result by Evelyn Echevarria MD (05/19/20 21:52:30)                 Impression:      1. No evidence of PE.  2. Numerous scattered bilateral pulmonary nodules, the largest of which measures 2.7 cm within the left lower lobe.  Potential primary neoplastic process and/or metastatic disease or of concern.  For multiple solid nodules with any 6 mm or greater, Fleischner Society guidelines recommend follow up with non-contrast chest CT at 3-6 months and 18-24 months after discovery versus further evaluation with PET/CT and/or biopsy.  3. Mild to moderate pulmonary emphysema.  4. Age indeterminate severe L1 vertebral body compression fracture, possibly acute.  No retropulsion seen into the anterior spinal canal.  Correlate for trauma and point tenderness in this region.  5. Additional findings as detailed above.  This report was flagged in Epic as abnormal.      Electronically signed by: Evelyn Echevarria MD  Date:    05/19/2020  Time:    21:52             Narrative:    EXAMINATION:  CTA CHEST NON CORONARY    CLINICAL HISTORY:  Chest pain, acute, PE suspected, high pretest prob;    TECHNIQUE:  Low dose axial images, sagittal and coronal reformations were obtained from the thoracic inlet to the lung bases following the IV administration of  100 mL of Omnipaque 350.  Contrast timing was optimized to evaluate the pulmonary arteries.  MIP images were performed.    COMPARISON:  None    FINDINGS:  Structures at the base of the neck are unremarkable.  Postsurgical sternotomy changes are seen with findings likely related to prior CABG procedure.  There is aortic and coronary artery calcification.  Aorta is non-aneurysmal.  The heart is normal in size without pericardial effusion.  No intraluminal filling defects within the pulmonary arteries to suggest pulmonary thromboembolism.   There is no evidence of mediastinal, axillary, or hilar lymph node enlargement.  The esophagus is unremarkable along its course.    The trachea and bronchi are patent.  The lungs are symmetrically expanded.  There is mild-to-moderate pulmonary centrilobular emphysema with upper lobe predominance.  Numerous scattered bilateral pulmonary nodules are visualized.  Largest of these measures 2.7 cm within the medial aspect of the left lower lobe.  There is mild bibasilar scarring or atelectasis.  No evidence of pleural effusion.    Reflux of contrast is seen into dilated hepatic veins which may be indicative of elevated right heart pressures.  Small nonobstructing renal stones are seen.  Few calcified splenic granulomas are noted.  Osseous structures demonstrate degenerative change.  Severe compression fracture is seen of the L1 vertebral body.  Extrathoracic soft tissues are unremarkable.                                X-Ray Chest AP Portable (Final result)  Result time 05/19/20 20:12:09    Final result by Karl Iniguez MD (05/19/20 20:12:09)                 Impression:      Slight asymmetric prominence of the left hilar region which could be artifactual related to AP portable technique and patient rotation with prominence of underlying pulmonary vasculature or left hilar lymphadenopathy or mass not excluded on the basis of this examination.  Otherwise, no radiographic acute  intrathoracic process seen on this single view, noting chronic appearing findings as above.  Consider short-term follow-up chest radiography after therapy versus further evaluation with elective/nonemergent CT thorax as clinically indicated.    This report was flagged in Epic as abnormal.      Electronically signed by: Karl Iniguez MD  Date:    05/19/2020  Time:    20:12             Narrative:    EXAMINATION:  XR CHEST AP PORTABLE    CLINICAL HISTORY:  Chest Pain;    TECHNIQUE:  Single frontal view of the chest was performed.    COMPARISON:  None    FINDINGS:  Monitoring leads overlie the chest.  Patient is slightly rotated.    Sternotomy wires in place.  Cardiac silhouette is upper limits of normal in size.  There is tortuosity of the thoracic aorta.  Slight asymmetric prominence of the left hilar region.  Scattered bandlike opacities at the bilateral lung bases suggesting platelike scarring versus atelectasis.  The lungs are otherwise well expanded without large consolidation, pleural effusion or pneumothorax.  No acute osseous process seen.  PA and lateral views can be obtained.                                 Assessment:     Karthikeyan Marshall is a 86 y.o. male with history of CAD s/p CABG x 4 and now presenting with fever and RBBB on EKG. He denies chest pain but has a mild troponin elevation. Fever with unknown source of infection. Troponin elevation likely from demand ischemia.      Plan:     - continue medical management with ASA and Plavix daily   - continue Metoprolol Succinate 25 mg daily, restart irbesartan 75 mg daily   - continue telemetry, stop trending troponin levels     Tk Esposito MD  [facility]

## 2020-05-20 NOTE — ED PROVIDER NOTES
Encounter Date: 5/19/2020       History     Chief Complaint   Patient presents with    Fever     Patient presents to the ED via [facility] from home. Wife reports patient with chills, shortness of breath, malaise that started this afternoon. Wife states patient had a 101.4 temperature at home and was tachycardic and hypertensive. EMS provided 324 of aspirin and x 3 sprays of nitroglycerin. Code STEMI called prior to arrival per EMS and Dr. Reynoso.      Shortness of Breath     86-year-old male presents from home for chest pain.  Presents via EMS as possible STEMI alert however patient has right bundle pattern to his EKG that is a STEMI mimic.  The patient's chief complaint is fever and chest pain.  Around 3:00 p.m. he started to have fever and chills as well as a pressure-like anterior chest pain nonradiating.  No shortness of breath.  No nausea vomiting.  No diaphoresis.  No recent cough.  No diarrhea.  No rashes.    The history is provided by the patient and the spouse.     Review of patient's allergies indicates:   Allergen Reactions    Adhesive     Adhesive tape-silicones      Other reaction(s): rash with most tapes. ok if use paer tape    Bactrim [sulfamethoxazole-trimethoprim]      Past Medical History:   Diagnosis Date    Cancer     Emphysema lung     GERD (gastroesophageal reflux disease)     Hyperlipidemia     Hypertension     Seizures     Type II or unspecified type diabetes mellitus without mention of complication, not stated as uncontrolled      Past Surgical History:   Procedure Laterality Date    APPENDECTOMY      BASAL CELL CARCINOMA EXCISION Left 02/01/2017    nose    CARDIAC CATHETERIZATION      COLONOSCOPY      2010    CORONARY ARTERY BYPASS GRAFT      HERNIA REPAIR      LUNG CANCER SURGERY      PROSTATE SURGERY      TOE AMPUTATION Left     TONSILLECTOMY       Family History   Problem Relation Age of Onset    Cancer Mother     Hypertension Father     Heart disease Father       Social History     Tobacco Use    Smoking status: Former Smoker    Smokeless tobacco: Never Used   Substance Use Topics    Alcohol use: Yes    Drug use: No     Review of Systems   Constitutional: Positive for chills and fever.   HENT: Negative for congestion, rhinorrhea and sore throat.    Eyes: Negative for visual disturbance.   Respiratory: Negative for cough and shortness of breath.    Cardiovascular: Positive for chest pain.   Gastrointestinal: Negative for abdominal pain, diarrhea, nausea and vomiting.   Genitourinary: Negative for dysuria and hematuria.   Musculoskeletal: Negative for back pain and myalgias.   Skin: Negative for pallor and rash.   Neurological: Negative for dizziness and weakness.       Physical Exam     Initial Vitals [05/19/20 1927]   BP Pulse Resp Temp SpO2   (!) 112/58 (!) 118 16 (!) 101.1 °F (38.4 °C) 97 %      MAP       --         Physical Exam    Nursing note and vitals reviewed.  Constitutional: He appears well-developed and well-nourished. No distress.   HENT:   Head: Normocephalic and atraumatic.   Mouth/Throat: Oropharynx is clear and moist.   Eyes: Conjunctivae and EOM are normal. Pupils are equal, round, and reactive to light.   Neck: Normal range of motion. Neck supple.   Cardiovascular: Regular rhythm and intact distal pulses.   Tachycardia   Pulmonary/Chest: No stridor. No respiratory distress.   Abdominal: Soft. He exhibits no distension. There is no tenderness.   Musculoskeletal: Normal range of motion.   Moving all extremities with grossly normal strength   Neurological: He is alert and oriented to person, place, and time.   CN 2-12 appear grossly intact   Skin: Skin is warm and dry.   Psychiatric: He has a normal mood and affect.         ED Course   Procedures  Labs Reviewed   CBC W/ AUTO DIFFERENTIAL - Abnormal; Notable for the following components:       Result Value    RBC 3.65 (*)     Hemoglobin 11.3 (*)     Hematocrit 34.4 (*)     RDW 15.9 (*)     Gran # (ANC)  9.3 (*)     Immature Grans (Abs) 0.05 (*)     Gran% 80.6 (*)     Lymph% 10.9 (*)     All other components within normal limits   COMPREHENSIVE METABOLIC PANEL - Abnormal; Notable for the following components:    Glucose 129 (*)     BUN, Bld 26 (*)     Alkaline Phosphatase 150 (*)     Anion Gap 6 (*)     All other components within normal limits   B-TYPE NATRIURETIC PEPTIDE - Abnormal; Notable for the following components:     (*)     All other components within normal limits   CULTURE, BLOOD   CULTURE, BLOOD   SARS-COV-2 RNA AMPLIFICATION, QUAL   TROPONIN I   LACTIC ACID, PLASMA   URINALYSIS, REFLEX TO URINE CULTURE   TYPE & SCREEN     EKG Readings: (Independently Interpreted)   Rhythm: Normal Sinus Rhythm. Heart Rate: 123. Conduction: RBBB. Other Impression: Sinus tachycardia with a right bundle-branch block, no prior for comparison       Imaging Results           X-Ray Chest AP Portable (Final result)  Result time 05/19/20 20:12:09    Final result by Karl Iniguez MD (05/19/20 20:12:09)                 Impression:      Slight asymmetric prominence of the left hilar region which could be artifactual related to AP portable technique and patient rotation with prominence of underlying pulmonary vasculature or left hilar lymphadenopathy or mass not excluded on the basis of this examination.  Otherwise, no radiographic acute intrathoracic process seen on this single view, noting chronic appearing findings as above.  Consider short-term follow-up chest radiography after therapy versus further evaluation with elective/nonemergent CT thorax as clinically indicated.    This report was flagged in Epic as abnormal.      Electronically signed by: Kalr Iniguez MD  Date:    05/19/2020  Time:    20:12             Narrative:    EXAMINATION:  XR CHEST AP PORTABLE    CLINICAL HISTORY:  Chest Pain;    TECHNIQUE:  Single frontal view of the chest was performed.    COMPARISON:  None    FINDINGS:  Monitoring leads overlie the  chest.  Patient is slightly rotated.    Sternotomy wires in place.  Cardiac silhouette is upper limits of normal in size.  There is tortuosity of the thoracic aorta.  Slight asymmetric prominence of the left hilar region.  Scattered bandlike opacities at the bilateral lung bases suggesting platelike scarring versus atelectasis.  The lungs are otherwise well expanded without large consolidation, pleural effusion or pneumothorax.  No acute osseous process seen.  PA and lateral views can be obtained.                                 Medical Decision Making:   Initial Assessment:   Well-appearing but febrile and tachycardic  Differential Diagnosis:   Sepsis, ACS, pneumonia, COVID-19                   ED Course as of May 28 1620   Tue May 19, 2020   2058 Received sign-out from physician.  Resting bed, no acute distress, no obvious source of fever at this time.  Abnormal chest x-ray.  Will obtain CTA given patient persistently tachycardic.  Will wait for rest of investigations and will reassess    [SE]   2216 Resting comfortably in bed, no acute distress.  Vital stabilizing, fever has improved.  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  Discussed with patient's wife, will give antibiotics for UTI, weight 2nd set troponin, likely plan discharge.  Patient and wife understood agreed with plan.    [SE]      ED Course User Index  [SE] Edilberto Cisse MD                Clinical Impression:       ICD-10-CM ICD-9-CM   1. Elevated troponin R79.89 790.6   2. Chest pain R07.9 786.50   3. Fever, unspecified fever cause R50.9 780.60   4. Elevated troponin R79.89 790.6                                Brandon Reynoso, DO  05/28/20 1620

## 2020-05-20 NOTE — DISCHARGE SUMMARY
[facility] Hospital Medicine  Discharge Summary      Patient Name: Karthikeyan Marshall  MRN: [id number]  Admission Date: 5/19/2020  Hospital Length of Stay: 0 days  Discharge Date and Time: 5/20/2020 12:33 PM  Attending Physician: No att. providers found   Discharging Provider: Jamison Lal Jr, MD  Primary Care Provider: Cy Blunt MD        HPI: 86-year-old male presents from home for chest pain.  Presents via EMS as possible STEMI alert however patient has right bundle pattern to his EKG that is a STEMI mimic.  The patient's chief complaint is fever and chest pain.  Around 3:00 p.m. he started to have fever and chills as well as a pressure-like anterior chest pain nonradiating.  No shortness of breath.  No nausea vomiting.  No diaphoresis.  No recent cough.  No diarrhea.  No rashes.        Workup in ED revealed 2 times upper trending troponins.  CTA negative.  Patient admitted to [facility] for chest pain evaluation.     * No surgery found *      Hospital Course:  While in the ED [facility] patient had no further episodes of chest pain or shortness of breath.  Patient evaluated by [facility].  Troponin elevation likely secondary to demand ischemia.  Patient is at baseline no noted source for fever.  Plan for discharge home with follow-up per Cardiology in 1-2 weeks.  Discussed plan with patient's wife.  Consults:   Consults (From admission, onward)        Status Ordering Provider     Inpatient consult to Interventional Cardiology  Once     Provider:  Shar Smith MD    Completed LAURITA BRYAN          Final Active Diagnoses:    Diagnosis Date Noted POA    Elevated troponin [R79.89] 05/19/2020 Unknown    Fever [R50.9] 05/19/2020 Unknown    Chest pain [R07.9] 05/19/2020 Unknown      Problems Resolved During this Admission:      Discharged Condition: good    Disposition: Home or Self Care    Follow Up:PCP and cardiology     Patient Instructions:   No discharge procedures on  file.  Medications:  Reconciled Home Medications:      Medication List      ASK your doctor about these medications    albuterol-ipratropium 2.5 mg-0.5 mg/3 mL nebulizer solution  Commonly known as:  DUO-NEB  NEBULIZE 1 VIAL 3 TIMES DAILY     atorvastatin 20 MG tablet  Commonly known as:  LIPITOR     blood sugar diagnostic Strp  Commonly known as:  BLOOD GLUCOSE TEST  Strips for one to 2 times a day testing   dispense brand covered by insurance and to match meter brand     blood-glucose meter kit  Dispense meter  brand covered by insurance     cefUROXime 250 MG tablet  Commonly known as:  CEFTIN  Take 250 mg by mouth 2 (two) times daily.     CENTRUM SILVER ORAL  Take by mouth once daily.     clopidogreL 75 mg tablet  Commonly known as:  PLAVIX     diabetic supplies, [employer]ell[employer]. Misc  Lancets for 1-2 times a day testing    dispense brand covered by insurance t     gabapentin 100 MG capsule  Commonly known as:  NEURONTIN  1 tablet up to 3 times daily for neuropathy.  May cause sedation     irbesartan 75 MG tablet  Commonly known as:  AVAPRO  Take 75 mg by mouth every evening.     levETIRAcetam 750 MG Tab  Commonly known as:  KEPPRA  Take 750 mg by mouth 2 (two) times daily.     levothyroxine 25 MCG tablet  Commonly known as:  SYNTHROID  Take 1 tablet (25 mcg total) by mouth before breakfast.     linezolid 600 mg Tab  Commonly known as:  ZYVOX  Take 600 mg by mouth every 12 (twelve) hours.     memantine 5 MG Tab  Commonly known as:  NAMENDA  1 tablet once daily.     metoprolol succinate 25 MG 24 hr tablet  Commonly known as:  TOPROL-XL  Take 25 mg by mouth once daily.     pantoprazole 40 MG tablet  Commonly known as:  PROTONIX  TAKE 1 BY MOUTH ONCE A DAY     phenytoin 100 MG ER capsule  Commonly known as:  DILANTIN  2 capsules by mouth every morning and 3 capsules by mouth every evening     VITAMIN C 500 MG tablet  Generic drug:  ascorbic acid (vitamin C)  Take 500 mg by mouth once daily.     VITAMIN D-3 ORAL  Take  5,000 Units by mouth once daily.            Significant Diagnostic Studies: Labs:   BMP: No results for input(s): GLU, NA, K, CL, CO2, BUN, CREATININE, CALCIUM, MG in the last 48 hours., CMP No results for input(s): NA, K, CL, CO2, GLU, BUN, CREATININE, CALCIUM, PROT, ALBUMIN, BILITOT, ALKPHOS, AST, ALT, ANIONGAP, ESTGFRAFRICA, EGFRNONAA in the last 48 hours., CBC No results for input(s): WBC, HGB, HCT, PLT in the last 48 hours. and Troponin   Recent Labs   Lab 05/20/20  0819   TROPONINI 0.055*       Pending Diagnostic Studies:     None        Indwelling Lines/Drains at time of discharge:   Lines/Drains/Airways     None                 Time spent on the discharge of patient: 30 minutes  Patient was seen and examined on the date of discharge and determined to be suitable for discharge.         Jamison Lal Jr, MD  Department of Hospital Medicine  [facility]

## 2020-05-20 NOTE — DISCHARGE INSTRUCTIONS
Please follow-up with your primary care physician within 1 week please return if you have worsening shortness of breath, chest pain or generally feel worse.

## 2020-05-20 NOTE — ED NOTES
Patient rested comfortably overnight. NAD noted. Remained afebrile and denies any complaints of SOB. Fall precautions explained and maintained. Call light in reach. Urinal at bedside. Patient verbalizes understanding of updated POC. Will continue to monitor.

## 2020-05-25 LAB
BACTERIA BLD CULT: NORMAL
BACTERIA BLD CULT: NORMAL

## 2020-08-26 ENCOUNTER — TELEPHONE (OUTPATIENT)
Dept: FAMILY MEDICINE | Facility: CLINIC | Age: 85
End: 2020-08-26

## 2020-08-26 NOTE — TELEPHONE ENCOUNTER
----- Message from Victorina Hogue sent at 8/26/2020  8:23 AM CDT -----  Regarding: pharmacy  Contact: [phone]/Rona Vázquez with [employer] is requesting a current list of medications. Please call. Thanks

## 2020-08-26 NOTE — TELEPHONE ENCOUNTER
Spoke with patient's daughter. She states she's not familiar with this pharmacy. Patient does go to wound care at [facility]; maybe they're ordering things for patient from this pharmacy. Patient has a appointment at 11 am today; she will find out and call us back.

## 2020-08-26 NOTE — TELEPHONE ENCOUNTER
I spoke with the pt wife   She is not familiar with this company  Do not send any info to them  Per his wife request    She will call this company to f/u with this

## 2020-08-28 ENCOUNTER — TELEPHONE (OUTPATIENT)
Dept: FAMILY MEDICINE | Facility: CLINIC | Age: 85
End: 2020-08-28

## 2020-08-28 NOTE — TELEPHONE ENCOUNTER
"Patient medication list has been confirmed with his insurance.  No further action needed at this time.      ----- Message from Victorina Hogue sent at 8/28/2020  3:13 PM CDT -----  Regarding: Pharmacy  Contact: Jeet/Lily/[phone]  [employer] is requesting to confirm if patient is being treated for cognitive impairment.  Is that treatment "memantine (NAMENDA) 5 MG Tab" Please call to confirm. Thanks Jeet/Lily/[phone]      "

## 2020-10-06 RX ORDER — IBUPROFEN 200 MG
CAPSULE ORAL
Qty: 180 EACH | Refills: 3 | Status: SHIPPED | OUTPATIENT
Start: 2020-10-06

## 2020-10-06 NOTE — TELEPHONE ENCOUNTER
----- Message from Klever Russo sent at 10/6/2020 12:47 PM CDT -----  Regarding: medicine  Type:  Needs Medical Advice    Who Called: patient wife  Reason: patient needs test strips ( one touch ultra blue gary 33)  Meter: one touch ultra 2    Pharmacy name and phone #:  [id number] - [address] [facility]  Would the patient rather a call back or a response via [employer]? N/a  Best Call Back Number: n/a  Additional Information: n/a

## 2020-10-15 ENCOUNTER — TELEPHONE (OUTPATIENT)
Dept: FAMILY MEDICINE | Facility: CLINIC | Age: 85
End: 2020-10-15
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
## 2020-10-15 DIAGNOSIS — G62.9 PERIPHERAL POLYNEUROPATHY: ICD-10-CM

## 2020-10-15 DIAGNOSIS — R26.9 GAIT DISTURBANCE: Primary | ICD-10-CM

## 2020-10-15 NOTE — TELEPHONE ENCOUNTER
Pt needs a new bed side commode.  Will you order a new one? If so sign the order below and   I will fax to [facility] - I have already discussed with the pt wife      Can you add the wound to his foot as a dx also?

## 2020-12-15 ENCOUNTER — TELEPHONE (OUTPATIENT)
Dept: FAMILY MEDICINE | Facility: CLINIC | Age: 85
End: 2020-12-15

## 2020-12-15 DIAGNOSIS — G62.9 PERIPHERAL POLYNEUROPATHY: ICD-10-CM

## 2020-12-15 DIAGNOSIS — G45.9 BRAIN TIA: ICD-10-CM

## 2020-12-15 DIAGNOSIS — R73.9 HYPERGLYCEMIA: ICD-10-CM

## 2020-12-15 DIAGNOSIS — E78.5 HYPERLIPIDEMIA, UNSPECIFIED HYPERLIPIDEMIA TYPE: Primary | ICD-10-CM

## 2020-12-15 DIAGNOSIS — I10 ESSENTIAL HYPERTENSION: ICD-10-CM

## 2020-12-15 DIAGNOSIS — K21.9 GASTROESOPHAGEAL REFLUX DISEASE WITHOUT ESOPHAGITIS: ICD-10-CM

## 2020-12-15 DIAGNOSIS — R56.9 SEIZURES: ICD-10-CM

## 2020-12-15 NOTE — TELEPHONE ENCOUNTER
patient has appt Friday     Needs labs   a1c lipid   Please order all of these test again for [facility] collect          I did leave him a detailed message about having these labs drawn prior to this appt    I also sent his wife a portal message advising that both of them need labs drawn

## 2020-12-18 ENCOUNTER — OFFICE VISIT (OUTPATIENT)
Dept: FAMILY MEDICINE | Facility: CLINIC | Age: 85
End: 2020-12-18
Payer: MEDICARE

## 2020-12-18 VITALS
WEIGHT: 176.81 LBS | HEART RATE: 94 BPM | HEIGHT: 72 IN | DIASTOLIC BLOOD PRESSURE: 58 MMHG | BODY MASS INDEX: 23.95 KG/M2 | TEMPERATURE: 97 F | SYSTOLIC BLOOD PRESSURE: 100 MMHG | OXYGEN SATURATION: 96 %

## 2020-12-18 DIAGNOSIS — I10 ESSENTIAL HYPERTENSION: ICD-10-CM

## 2020-12-18 DIAGNOSIS — J43.9 PULMONARY EMPHYSEMA, UNSPECIFIED EMPHYSEMA TYPE: ICD-10-CM

## 2020-12-18 DIAGNOSIS — E03.9 HYPOTHYROIDISM, UNSPECIFIED TYPE: ICD-10-CM

## 2020-12-18 DIAGNOSIS — Z23 NEED FOR INFLUENZA VACCINATION: ICD-10-CM

## 2020-12-18 DIAGNOSIS — R73.9 HYPERGLYCEMIA: ICD-10-CM

## 2020-12-18 DIAGNOSIS — I50.9 HEART FAILURE, UNSPECIFIED HF CHRONICITY, UNSPECIFIED HEART FAILURE TYPE: Primary | ICD-10-CM

## 2020-12-18 DIAGNOSIS — G62.9 PERIPHERAL POLYNEUROPATHY: ICD-10-CM

## 2020-12-18 DIAGNOSIS — E11.40 TYPE 2 DIABETES MELLITUS WITH DIABETIC NEUROPATHY, WITHOUT LONG-TERM CURRENT USE OF INSULIN: ICD-10-CM

## 2020-12-18 DIAGNOSIS — R56.9 SEIZURES: ICD-10-CM

## 2020-12-18 LAB
ALBUMIN SERPL-MCNC: 4.3 G/DL (ref 3.6–5.1)
ALBUMIN/GLOB SERPL: 1.8 (CALC) (ref 1–2.5)
ALP SERPL-CCNC: 139 U/L (ref 35–144)
ALT SERPL-CCNC: 32 U/L (ref 9–46)
AST SERPL-CCNC: 37 U/L (ref 10–35)
BASOPHILS # BLD AUTO: 58 CELLS/UL (ref 0–200)
BASOPHILS NFR BLD AUTO: 0.8 %
BILIRUB SERPL-MCNC: 0.5 MG/DL (ref 0.2–1.2)
BUN SERPL-MCNC: 22 MG/DL (ref 7–25)
BUN/CREAT SERPL: 19 (CALC) (ref 6–22)
CALCIUM SERPL-MCNC: 9.4 MG/DL (ref 8.6–10.3)
CHLORIDE SERPL-SCNC: 107 MMOL/L (ref 98–110)
CHOLEST SERPL-MCNC: 166 MG/DL
CHOLEST/HDLC SERPL: 2.2 (CALC)
CO2 SERPL-SCNC: 28 MMOL/L (ref 20–32)
CREAT SERPL-MCNC: 1.14 MG/DL (ref 0.7–1.11)
EOSINOPHIL # BLD AUTO: 223 CELLS/UL (ref 15–500)
EOSINOPHIL NFR BLD AUTO: 3.1 %
ERYTHROCYTE [DISTWIDTH] IN BLOOD BY AUTOMATED COUNT: 14.2 % (ref 11–15)
GFRSERPLBLD MDRD-ARVRAT: 58 ML/MIN/1.73M2
GLOBULIN SER CALC-MCNC: 2.4 G/DL (CALC) (ref 1.9–3.7)
GLUCOSE SERPL-MCNC: 104 MG/DL (ref 65–99)
HBA1C MFR BLD: 5.8 % OF TOTAL HGB
HCT VFR BLD AUTO: 40.4 % (ref 38.5–50)
HDLC SERPL-MCNC: 77 MG/DL
HGB BLD-MCNC: 13.4 G/DL (ref 13.2–17.1)
LDLC SERPL CALC-MCNC: 70 MG/DL (CALC)
LYMPHOCYTES # BLD AUTO: 2239 CELLS/UL (ref 850–3900)
LYMPHOCYTES NFR BLD AUTO: 31.1 %
MCH RBC QN AUTO: 30.9 PG (ref 27–33)
MCHC RBC AUTO-ENTMCNC: 33.2 G/DL (ref 32–36)
MCV RBC AUTO: 93.1 FL (ref 80–100)
MONOCYTES # BLD AUTO: 778 CELLS/UL (ref 200–950)
MONOCYTES NFR BLD AUTO: 10.8 %
NEUTROPHILS # BLD AUTO: 3902 CELLS/UL (ref 1500–7800)
NEUTROPHILS NFR BLD AUTO: 54.2 %
NONHDLC SERPL-MCNC: 89 MG/DL (CALC)
PLATELET # BLD AUTO: 190 THOUSAND/UL (ref 140–400)
PMV BLD REES-ECKER: 10 FL (ref 7.5–12.5)
POTASSIUM SERPL-SCNC: 4.5 MMOL/L (ref 3.5–5.3)
PROT SERPL-MCNC: 6.7 G/DL (ref 6.1–8.1)
RBC # BLD AUTO: 4.34 MILLION/UL (ref 4.2–5.8)
SODIUM SERPL-SCNC: 144 MMOL/L (ref 135–146)
TRIGL SERPL-MCNC: 103 MG/DL
TSH SERPL-ACNC: 6.41 MIU/L (ref 0.4–4.5)
WBC # BLD AUTO: 7.2 THOUSAND/UL (ref 3.8–10.8)

## 2020-12-18 PROCEDURE — 99397 PER PM REEVAL EST PAT 65+ YR: CPT | Mod: 25,S$GLB,, | Performed by: FAMILY MEDICINE

## 2020-12-18 PROCEDURE — 1157F PR ADVANCE CARE PLAN OR EQUIV PRESENT IN MEDICAL RECORD: ICD-10-PCS | Mod: S$GLB,,, | Performed by: FAMILY MEDICINE

## 2020-12-18 PROCEDURE — 1100F PTFALLS ASSESS-DOCD GE2>/YR: CPT | Mod: S$GLB,,, | Performed by: FAMILY MEDICINE

## 2020-12-18 PROCEDURE — G0008 ADMIN INFLUENZA VIRUS VAC: HCPCS | Mod: S$GLB,,, | Performed by: FAMILY MEDICINE

## 2020-12-18 PROCEDURE — 1157F ADVNC CARE PLAN IN RCRD: CPT | Mod: S$GLB,,, | Performed by: FAMILY MEDICINE

## 2020-12-18 PROCEDURE — 3288F FALL RISK ASSESSMENT DOCD: CPT | Mod: S$GLB,,, | Performed by: FAMILY MEDICINE

## 2020-12-18 PROCEDURE — 1126F PR PAIN SEVERITY QUANTIFIED, NO PAIN PRESENT: ICD-10-PCS | Mod: S$GLB,,, | Performed by: FAMILY MEDICINE

## 2020-12-18 PROCEDURE — 1126F AMNT PAIN NOTED NONE PRSNT: CPT | Mod: S$GLB,,, | Performed by: FAMILY MEDICINE

## 2020-12-18 PROCEDURE — 99397 PR PREVENTIVE VISIT,EST,65 & OVER: ICD-10-PCS | Mod: 25,S$GLB,, | Performed by: FAMILY MEDICINE

## 2020-12-18 PROCEDURE — G0008 FLU VACCINE - QUADRIVALENT - ADJUVANTED: ICD-10-PCS | Mod: S$GLB,,, | Performed by: FAMILY MEDICINE

## 2020-12-18 PROCEDURE — 90694 VACC AIIV4 NO PRSRV 0.5ML IM: CPT | Mod: S$GLB,,, | Performed by: FAMILY MEDICINE

## 2020-12-18 PROCEDURE — 90694 FLU VACCINE - QUADRIVALENT - ADJUVANTED: ICD-10-PCS | Mod: S$GLB,,, | Performed by: FAMILY MEDICINE

## 2020-12-18 PROCEDURE — 3288F PR FALLS RISK ASSESSMENT DOCUMENTED: ICD-10-PCS | Mod: S$GLB,,, | Performed by: FAMILY MEDICINE

## 2020-12-18 PROCEDURE — 1100F PR PT FALLS ASSESS DOC 2+ FALLS/FALL W/INJURY/YR: ICD-10-PCS | Mod: S$GLB,,, | Performed by: FAMILY MEDICINE

## 2020-12-18 RX ORDER — POTASSIUM CHLORIDE 750 MG/1
CAPSULE, EXTENDED RELEASE ORAL
COMMUNITY
Start: 2020-11-02

## 2020-12-18 RX ORDER — FUROSEMIDE 20 MG/1
TABLET ORAL
COMMUNITY
Start: 2020-09-28

## 2020-12-18 RX ORDER — LEVOTHYROXINE SODIUM 50 UG/1
50 TABLET ORAL
Qty: 90 TABLET | Refills: 3 | Status: SHIPPED | OUTPATIENT
Start: 2020-12-18 | End: 2021-03-29 | Stop reason: SDUPTHER

## 2020-12-18 RX ORDER — LEVOTHYROXINE SODIUM 50 UG/1
50 TABLET ORAL
Qty: 90 TABLET | Refills: 3 | Status: SHIPPED | OUTPATIENT
Start: 2020-12-18 | End: 2020-12-18 | Stop reason: SDUPTHER

## 2020-12-18 NOTE — PROGRESS NOTES
Patient ID: Karthikeyan Marshall is a 87 y.o. male.    Chief Complaint: wellness exam and Fall (3 times last week)    HPI     Karthikeyan Marshall is a 87 y.o. male. here for annual exam.   Pt with multiple falls seems to loose balance when doing things out of position.  Stable htn-   NO size in years  Periferal neuropathy stable      Review of Symptoms    Constitutional: Negative.    HENT: Negative.    Eyes: Negative.    Respiratory: Negative.    Cardiovascular: Negative.    Gastrointestinal: Negative.    Endocrine: Negative.    Genitourinary: Negative.    Musculoskeletal: Negative.    Skin: Negative.    Allergic/Immunologic: Negative.    Neurological: Negative.    Hematological: Negative.    Psychiatric/Behavioral: Negative.      Except as above in HPI    Current Outpatient Medications on File Prior to Visit   Medication Sig Dispense Refill    albuterol-ipratropium (DUO-NEB) 2.5 mg-0.5 mg/3 mL nebulizer solution NEBULIZE 1 VIAL 3 TIMES DAILY  11    atorvastatin (LIPITOR) 20 MG tablet       blood sugar diagnostic (BLOOD GLUCOSE TEST) Strp Strips for one to 2 times a day testing    one touch ultra blue gary 33 180 each 3    blood-glucose meter kit Dispense meter  brand covered by insurance 1 each 0    CALCIUM CARBONATE/VITAMIN D3 (VITAMIN D-3 ORAL) Take 5,000 Units by mouth once daily.       clopidogrel (PLAVIX) 75 mg tablet       diabetic supplies, [employer]. Misc Lancets for 1-2 times a day testing    dispense brand covered by insurance t 180 each 3    FOLIC ACID/MULTIVIT-MIN/LUTEIN (CENTRUM SILVER ORAL) Take by mouth once daily.       furosemide (LASIX) 20 MG tablet TK 1 T PO D PRN FOR SWELLING      gabapentin (NEURONTIN) 100 MG capsule 1 tablet up to 3 times daily for neuropathy.  May cause sedation 270 capsule 3    levetiracetam (KEPPRA) 750 MG Tab Take 750 mg by mouth 2 (two) times daily.  12    memantine (NAMENDA) 5 MG Tab 1 tablet once daily.      pantoprazole (PROTONIX) 40 MG tablet TAKE 1 BY MOUTH ONCE A  DAY  12    phenytoin (DILANTIN) 100 MG ER capsule 2 capsules by mouth every morning and 3 capsules by mouth every evening  2    potassium chloride (MICRO-K) 10 MEQ CpSR TK ONE C PO D       No current facility-administered medications on file prior to visit.          Physical  Exam    Vitals:    12/18/20 1016   BP: (!) 100/58   BP Location: Right arm   Patient Position: Sitting   Pulse: 94   Temp: 97.1 °F (36.2 °C)   TempSrc: Oral   SpO2: 96%   Weight: 80.2 kg (176 lb 12.9 oz)   Height: 6' (1.829 m)          Constitutional:  Oriented to person, place, and time. Appears well-developed and well-nourished.     HENT:   Head: Normocephalic and atraumatic.     Right Ear: External ear normal     Left Ear: External ear normal      Nose: Nose normal. No rhinorrhea or nasal deformity.     Mouth/Throat: Moist mucus membranes      Eyes: Conjunctivae are normal. Right eye exhibits no discharge. Left eye exhibits no  discharge. No scleral icterus.     Neck:  No JVD present. No tracheal deviation  []  Neck supple.   Carotid Arteries  []  No Bruit    Cardiovascular:  Regular rate and rhythm with normal S1 and S2     Pulmonary/Chest:   Clear to auscultation bilaterally without wheezes, rhonchi or rales    Abdominal: Soft. No distension and no mass.  No tenderness. No rebound and No guarding.     Musculoskeletal: Normal range of motion. No edema or tenderness.   No deformity     Lymphadenopathy:   []  No cervical adenopathy.  []  No inguinal adenopathy    Neurological:  Alert and oriented to person, place, and time. Coordination normal.     Skin: Skin is warm and dry. No rash noted. No bruising     Psychiatric: Normal mood and affect. Speech is normal and behavior is normal. Judgment and thought content normal.       Assessment / Plan:      ICD-10-CM ICD-9-CM   1. Heart failure, unspecified HF chronicity, unspecified heart failure type  I50.9 428.9   2. Need for influenza vaccination  Z23 V04.81   3. Pulmonary emphysema, unspecified  emphysema type  J43.9 492.8   4. Seizures  R56.9 780.39   5. Essential hypertension  I10 401.9   6. Hyperglycemia  R73.9 790.29   7. Peripheral polyneuropathy  G62.9 356.9   8. Type 2 diabetes mellitus with diabetic neuropathy, without long-term current use of insulin  E11.40 250.60     357.2   9. Hypothyroidism, unspecified type  E03.9 244.9     Heart failure, unspecified HF chronicity, unspecified heart failure type    Need for influenza vaccination  -     Influenza (FLUAD) - Quadrivalent (Adjuvanted) *Preferred* (65+) (PF)    Pulmonary emphysema, unspecified emphysema type    Seizures    Essential hypertension    Hyperglycemia    Peripheral polyneuropathy    Type 2 diabetes mellitus with diabetic neuropathy, without long-term current use of insulin    Hypothyroidism, unspecified type  -     TSH; Future; Expected date: 12/18/2020  -     T4, Free; Future; Expected date: 12/18/2020    Other orders  -     Discontinue: levothyroxine (SYNTHROID) 50 MCG tablet; Take 1 tablet (50 mcg total) by mouth before breakfast.  Dispense: 90 tablet; Refill: 3  -     levothyroxine (SYNTHROID) 50 MCG tablet; Take 1 tablet (50 mcg total) by mouth before breakfast.  Dispense: 90 tablet; Refill: 3          Discussed how to stay healthy including: diet, exercise, refraining from smoking and discussed screening exams / tests needed for age, sex and family Hx.              Cy Barrera M.D.

## 2020-12-21 ENCOUNTER — PATIENT OUTREACH (OUTPATIENT)
Dept: ADMINISTRATIVE | Facility: HOSPITAL | Age: 85
End: 2020-12-21

## 2020-12-21 NOTE — PROGRESS NOTES
Sent E-fax to [facility] for foot exam records. Attached signed ELIZABETH.

## 2020-12-21 NOTE — LETTER
AUTHORIZATION FOR RELEASE OF   CONFIDENTIAL INFORMATION    Dear Dr. Torres,    We are seeing Karthikeyan Marshall, date of birth 1933, in the clinic at [facility]. Cy Blunt MD is the patient's PCP. Karthikeyan Marshall has an outstanding lab/procedure at the time we reviewed his chart. In order to help keep his health information updated, he has authorized us to request the following medical record(s):             ( X )  FOOT EXAM - Diabetic                                           Please fax records to us at [phone].     Attention: Anastasiya Montoya      If you have any questions, please contact me at [phone].          Patient Name: Karthikeyan Marshall  : 1933  Patient Phone #: [phone]

## 2020-12-21 NOTE — PROGRESS NOTES
Saved signed ELIZABETH in .   Sent E-fax to Dr. Torres for foot exam records. Attached signed ELIZABETH.

## 2020-12-21 NOTE — LETTER
AUTHORIZATION FOR RELEASE OF   CONFIDENTIAL INFORMATION    Dear [facility],    We are seeing Karthikeyan Marshall, date of birth 1933, in the clinic at [facility]. Cy Blunt MD is the patient's PCP. Karthikeyan Marshall has an outstanding lab/procedure at the time we reviewed his chart. In order to help keep his health information updated, he has authorized us to request the following medical record(s):             ( X )  FOOT EXAM- Diabetic                          Please fax records to us at [phone].     Attention: Anastasiya Montoya     If you have any questions, please contact me at [phone].          Patient Name: Karthikeyan Marshall  : 1933  Patient Phone #: [phone]

## 2021-01-15 LAB
LEFT EYE DM RETINOPATHY: NEGATIVE
RIGHT EYE DM RETINOPATHY: NEGATIVE

## 2021-01-18 ENCOUNTER — PATIENT MESSAGE (OUTPATIENT)
Dept: FAMILY MEDICINE | Facility: CLINIC | Age: 86
End: 2021-01-18

## 2021-01-21 ENCOUNTER — PATIENT OUTREACH (OUTPATIENT)
Dept: ADMINISTRATIVE | Facility: HOSPITAL | Age: 86
End: 2021-01-21

## 2021-02-25 ENCOUNTER — OFFICE VISIT (OUTPATIENT)
Dept: FAMILY MEDICINE | Facility: CLINIC | Age: 86
End: 2021-02-25
Payer: MEDICARE

## 2021-02-25 ENCOUNTER — TELEPHONE (OUTPATIENT)
Dept: FAMILY MEDICINE | Facility: CLINIC | Age: 86
End: 2021-02-25

## 2021-02-25 VITALS
SYSTOLIC BLOOD PRESSURE: 126 MMHG | WEIGHT: 172.81 LBS | BODY MASS INDEX: 23.41 KG/M2 | TEMPERATURE: 96 F | HEART RATE: 96 BPM | HEIGHT: 72 IN | DIASTOLIC BLOOD PRESSURE: 70 MMHG | OXYGEN SATURATION: 95 %

## 2021-02-25 DIAGNOSIS — R53.81 PHYSICAL DEBILITY: ICD-10-CM

## 2021-02-25 DIAGNOSIS — F48.8: ICD-10-CM

## 2021-02-25 DIAGNOSIS — E03.9 HYPOTHYROIDISM, UNSPECIFIED TYPE: ICD-10-CM

## 2021-02-25 DIAGNOSIS — E11.40 TYPE 2 DIABETES MELLITUS WITH DIABETIC NEUROPATHY, WITHOUT LONG-TERM CURRENT USE OF INSULIN: ICD-10-CM
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
## 2021-02-25 DIAGNOSIS — I10 ESSENTIAL HYPERTENSION: Primary | ICD-10-CM

## 2021-02-25 DIAGNOSIS — G45.9 BRAIN TIA: ICD-10-CM

## 2021-02-25 DIAGNOSIS — R73.9 HYPERGLYCEMIA: ICD-10-CM

## 2021-02-25 PROCEDURE — 3288F PR FALLS RISK ASSESSMENT DOCUMENTED: ICD-10-PCS | Mod: CPTII,S$GLB,, | Performed by: FAMILY MEDICINE

## 2021-02-25 PROCEDURE — 1159F PR MEDICATION LIST DOCUMENTED IN MEDICAL RECORD: ICD-10-PCS | Mod: S$GLB,,, | Performed by: FAMILY MEDICINE

## 2021-02-25 PROCEDURE — 99213 PR OFFICE/OUTPT VISIT, EST, LEVL III, 20-29 MIN: ICD-10-PCS | Mod: S$GLB,,, | Performed by: FAMILY MEDICINE

## 2021-02-25 PROCEDURE — 1157F PR ADVANCE CARE PLAN OR EQUIV PRESENT IN MEDICAL RECORD: ICD-10-PCS | Mod: S$GLB,,, | Performed by: FAMILY MEDICINE

## 2021-02-25 PROCEDURE — 1157F ADVNC CARE PLAN IN RCRD: CPT | Mod: S$GLB,,, | Performed by: FAMILY MEDICINE

## 2021-02-25 PROCEDURE — 3288F FALL RISK ASSESSMENT DOCD: CPT | Mod: CPTII,S$GLB,, | Performed by: FAMILY MEDICINE

## 2021-02-25 PROCEDURE — 1101F PR PT FALLS ASSESS DOC 0-1 FALLS W/OUT INJ PAST YR: ICD-10-PCS | Mod: CPTII,S$GLB,, | Performed by: FAMILY MEDICINE

## 2021-02-25 PROCEDURE — 1126F PR PAIN SEVERITY QUANTIFIED, NO PAIN PRESENT: ICD-10-PCS | Mod: S$GLB,,, | Performed by: FAMILY MEDICINE

## 2021-02-25 PROCEDURE — 1101F PT FALLS ASSESS-DOCD LE1/YR: CPT | Mod: CPTII,S$GLB,, | Performed by: FAMILY MEDICINE

## 2021-02-25 PROCEDURE — 99213 OFFICE O/P EST LOW 20 MIN: CPT | Mod: S$GLB,,, | Performed by: FAMILY MEDICINE

## 2021-02-25 PROCEDURE — 1126F AMNT PAIN NOTED NONE PRSNT: CPT | Mod: S$GLB,,, | Performed by: FAMILY MEDICINE

## 2021-02-25 PROCEDURE — 1159F MED LIST DOCD IN RCRD: CPT | Mod: S$GLB,,, | Performed by: FAMILY MEDICINE

## 2021-02-26 ENCOUNTER — PATIENT MESSAGE (OUTPATIENT)
Dept: FAMILY MEDICINE | Facility: CLINIC | Age: 86
End: 2021-02-26

## 2021-02-26 LAB
ALBUMIN SERPL-MCNC: 4.3 G/DL (ref 3.6–5.1)
ALBUMIN/GLOB SERPL: 1.8 (CALC) (ref 1–2.5)
ALP SERPL-CCNC: 163 U/L (ref 35–144)
ALT SERPL-CCNC: 23 U/L (ref 9–46)
AST SERPL-CCNC: 24 U/L (ref 10–35)
BASOPHILS # BLD AUTO: 28 CELLS/UL (ref 0–200)
BASOPHILS NFR BLD AUTO: 0.4 %
BILIRUB SERPL-MCNC: 0.4 MG/DL (ref 0.2–1.2)
BUN SERPL-MCNC: 26 MG/DL (ref 7–25)
BUN/CREAT SERPL: 22 (CALC) (ref 6–22)
CALCIUM SERPL-MCNC: 9.2 MG/DL (ref 8.6–10.3)
CHLORIDE SERPL-SCNC: 105 MMOL/L (ref 98–110)
CO2 SERPL-SCNC: 30 MMOL/L (ref 20–32)
CREAT SERPL-MCNC: 1.16 MG/DL (ref 0.7–1.11)
EOSINOPHIL # BLD AUTO: 128 CELLS/UL (ref 15–500)
EOSINOPHIL NFR BLD AUTO: 1.8 %
ERYTHROCYTE [DISTWIDTH] IN BLOOD BY AUTOMATED COUNT: 13.5 % (ref 11–15)
GFRSERPLBLD MDRD-ARVRAT: 56 ML/MIN/1.73M2
GLOBULIN SER CALC-MCNC: 2.4 G/DL (CALC) (ref 1.9–3.7)
GLUCOSE SERPL-MCNC: 109 MG/DL (ref 65–99)
HBA1C MFR BLD: 6 % OF TOTAL HGB
HCT VFR BLD AUTO: 38.4 % (ref 38.5–50)
HGB BLD-MCNC: 13.4 G/DL (ref 13.2–17.1)
LYMPHOCYTES # BLD AUTO: 2244 CELLS/UL (ref 850–3900)
LYMPHOCYTES NFR BLD AUTO: 31.6 %
MCH RBC QN AUTO: 32.2 PG (ref 27–33)
MCHC RBC AUTO-ENTMCNC: 34.9 G/DL (ref 32–36)
MCV RBC AUTO: 92.3 FL (ref 80–100)
MONOCYTES # BLD AUTO: 554 CELLS/UL (ref 200–950)
MONOCYTES NFR BLD AUTO: 7.8 %
NEUTROPHILS # BLD AUTO: 4146 CELLS/UL (ref 1500–7800)
NEUTROPHILS NFR BLD AUTO: 58.4 %
PLATELET # BLD AUTO: 175 THOUSAND/UL (ref 140–400)
PMV BLD REES-ECKER: 10.1 FL (ref 7.5–12.5)
POTASSIUM SERPL-SCNC: 4.4 MMOL/L (ref 3.5–5.3)
PROT SERPL-MCNC: 6.7 G/DL (ref 6.1–8.1)
RBC # BLD AUTO: 4.16 MILLION/UL (ref 4.2–5.8)
SODIUM SERPL-SCNC: 144 MMOL/L (ref 135–146)
T4 FREE SERPL-MCNC: 1 NG/DL (ref 0.8–1.8)
TSH SERPL-ACNC: 4.76 MIU/L (ref 0.4–4.5)
WBC # BLD AUTO: 7.1 THOUSAND/UL (ref 3.8–10.8)

## 2021-03-09 ENCOUNTER — TELEPHONE (OUTPATIENT)
Dept: FAMILY MEDICINE | Facility: CLINIC | Age: 86
End: 2021-03-09

## 2021-03-09 DIAGNOSIS — E03.9 HYPOTHYROIDISM, UNSPECIFIED TYPE: ICD-10-CM

## 2021-03-09 DIAGNOSIS — E11.40 TYPE 2 DIABETES MELLITUS WITH DIABETIC NEUROPATHY, WITHOUT LONG-TERM CURRENT USE OF INSULIN: ICD-10-CM

## 2021-03-09 DIAGNOSIS — C34.90 MALIGNANT NEOPLASM OF LUNG, UNSPECIFIED LATERALITY, UNSPECIFIED PART OF LUNG: ICD-10-CM

## 2021-03-09 DIAGNOSIS — I10 ESSENTIAL HYPERTENSION: Primary | ICD-10-CM

## 2021-03-15 ENCOUNTER — TELEPHONE (OUTPATIENT)
Dept: FAMILY MEDICINE | Facility: CLINIC | Age: 86
End: 2021-03-15

## 2021-03-29 RX ORDER — LEVOTHYROXINE SODIUM 50 UG/1
50 TABLET ORAL
Qty: 90 TABLET | Refills: 3 | Status: SHIPPED | OUTPATIENT
Start: 2021-03-29 | End: 2022-03-29

## 2021-04-07 ENCOUNTER — TELEPHONE (OUTPATIENT)
Dept: FAMILY MEDICINE | Facility: CLINIC | Age: 86
End: 2021-04-07

## 2021-04-09 ENCOUNTER — TELEPHONE (OUTPATIENT)
Dept: FAMILY MEDICINE | Facility: CLINIC | Age: 86
End: 2021-04-09

## 2021-04-12 ENCOUNTER — TELEPHONE (OUTPATIENT)
Dept: FAMILY MEDICINE | Facility: CLINIC | Age: 86
End: 2021-04-12

## 2021-04-12 DIAGNOSIS — C34.90 MALIGNANT NEOPLASM OF LUNG, UNSPECIFIED LATERALITY, UNSPECIFIED PART OF LUNG: Primary | ICD-10-CM

## 2021-04-13 ENCOUNTER — TELEPHONE (OUTPATIENT)
Dept: FAMILY MEDICINE | Facility: CLINIC | Age: 86
End: 2021-04-13

## 2021-11-23 ENCOUNTER — PATIENT MESSAGE (OUTPATIENT)
Dept: ADMINISTRATIVE | Facility: HOSPITAL | Age: 86
End: 2021-11-23
Payer: MEDICARE

## 2022-02-22 ENCOUNTER — PATIENT MESSAGE (OUTPATIENT)
Dept: ADMINISTRATIVE | Facility: HOSPITAL | Age: 87
End: 2022-02-22
Payer: MEDICARE

## 2022-05-31 ENCOUNTER — PATIENT MESSAGE (OUTPATIENT)
Dept: ADMINISTRATIVE | Facility: HOSPITAL | Age: 87
End: 2022-05-31
Payer: MEDICARE

## 2022-08-25 NOTE — TELEPHONE ENCOUNTER
----- Message from Skye Larson sent at 5/19/2017 11:34 AM CDT -----  Contact: wife  Calling to verify that referral for physical therapy has been sent to [facility]. Patient is going to see Cyndy Klein # [phone]   11-Aug-2022 11-Aug-2022 11-Aug-2022 11-Aug-2022 11-Aug-2022 11-Aug-2022 11-Aug-2022 11-Aug-2022 11-Aug-2022 11-Aug-2022 11-Aug-2022

## 2022-12-07 ENCOUNTER — PATIENT OUTREACH (OUTPATIENT)
Dept: ADMINISTRATIVE | Facility: HOSPITAL | Age: 87
End: 2022-12-07
Payer: MEDICARE

## 2024-08-25 NOTE — TELEPHONE ENCOUNTER
----- Message from Ilda Alcala sent at 10/20/2017  2:11 PM CDT -----  Contact: Josie pt's wife / [phone]  Patient requesting a order for a new walker.The walker that patient has right now is broken and cant be fixed.   
No